# Patient Record
Sex: FEMALE | Race: WHITE | Employment: FULL TIME | URBAN - METROPOLITAN AREA
[De-identification: names, ages, dates, MRNs, and addresses within clinical notes are randomized per-mention and may not be internally consistent; named-entity substitution may affect disease eponyms.]

---

## 2017-11-22 ENCOUNTER — OFFICE VISIT (OUTPATIENT)
Dept: ENDOCRINOLOGY | Age: 22
End: 2017-11-22

## 2017-11-22 VITALS
HEART RATE: 87 BPM | OXYGEN SATURATION: 98 % | WEIGHT: 114 LBS | RESPIRATION RATE: 16 BRPM | HEIGHT: 65 IN | DIASTOLIC BLOOD PRESSURE: 79 MMHG | BODY MASS INDEX: 18.99 KG/M2 | SYSTOLIC BLOOD PRESSURE: 101 MMHG

## 2017-11-22 DIAGNOSIS — E10.65 HYPERGLYCEMIA DUE TO TYPE 1 DIABETES MELLITUS (HCC): Primary | ICD-10-CM

## 2017-11-22 RX ORDER — LORATADINE 10 MG/1
1 TABLET ORAL
COMMUNITY

## 2017-11-22 RX ORDER — INSULIN GLARGINE 100 [IU]/ML
INJECTION, SOLUTION SUBCUTANEOUS
COMMUNITY
Start: 2014-12-09 | End: 2017-11-22 | Stop reason: ALTCHOICE

## 2017-11-22 RX ORDER — PEN NEEDLE, DIABETIC 30 GX3/16"
NEEDLE, DISPOSABLE MISCELLANEOUS
COMMUNITY
Start: 2014-03-24

## 2017-11-22 RX ORDER — LANCETS
EACH MISCELLANEOUS
Qty: 1 EACH | Refills: 11 | Status: SHIPPED | OUTPATIENT
Start: 2017-11-22

## 2017-11-22 RX ORDER — PEN NEEDLE, DIABETIC 30 GX3/16"
NEEDLE, DISPOSABLE MISCELLANEOUS
Qty: 1 PACKAGE | Refills: 5 | Status: SHIPPED | OUTPATIENT
Start: 2017-11-22

## 2017-11-22 RX ORDER — INSULIN ASPART 100 [IU]/ML
25 INJECTION, SUSPENSION SUBCUTANEOUS
Qty: 9 PEN | Refills: 3 | Status: SHIPPED | OUTPATIENT
Start: 2017-11-22 | End: 2018-02-22 | Stop reason: ALTCHOICE

## 2017-11-22 RX ORDER — DICLOFENAC SODIUM 10 MG/G
GEL TOPICAL
COMMUNITY
Start: 2015-05-22

## 2017-11-22 RX ORDER — MEDROXYPROGESTERONE ACETATE 150 MG/ML
INJECTION, SUSPENSION INTRAMUSCULAR
COMMUNITY
Start: 2014-05-30

## 2017-11-22 RX ORDER — METHYLPHENIDATE HYDROCHLORIDE 27 MG/1
36 TABLET ORAL
COMMUNITY

## 2017-11-22 RX ORDER — INSULIN ASPART 100 [IU]/ML
INJECTION, SOLUTION INTRAVENOUS; SUBCUTANEOUS
COMMUNITY
Start: 2015-12-15 | End: 2017-11-22 | Stop reason: ALTCHOICE

## 2017-11-22 NOTE — PROGRESS NOTES
Endocrinology Visit    Chief Complaint: Type 1 diabetes, uncontrolled    History of Present Illness: Milton Dobson is a 23 y.o. female who returns for poorly controlled type 1 diabetes mellitus with last A1c 13.5% in 2016 at the time of her initial visit with me. She has not followed up since that original appointment. Pt was previously followed by Dr Rell Sahu, a pediatric endocrinologist in [place], [place]. Per notes, A1c has been >14% for quite some time and there is mention of persistent poor diabetes control since her diagnosis. She was a sophomore at [employer] when I saw her in 2016 but she has since stopped taking classes because she has two jobs now ([employer] and [employer]). She admits she has a hard time keeping up with her insulin shots due to her work schedule. She is also trying to stretch out her insulin supply. Blood sugars are often high, rarely has lows. Current glycemic medication regimen is Novolog per 1:10 carb ratio TIDcc and she understands how to carb count; taking lantus 30 units daily (takes at bedtime). Last dose was yesterday morning. Uses pens instead of syringes and keeps them in her purse. Rotates injection sites and tries to avoid her stomach, uses her arms and legs mostly. Home blood glucose monitoring frequency: rarely, 3x in the past week  Glucose range at home: 200s, 'HI'. The patient has had hypoglycemia but none recently. She has had a seizure from a low blood sugar in the past (took Novolog instead of Lantus). She now has a tattoo showing that she has diabetes. She does have a glucagon emergency kit but believes it is . She has two roommates, both of whom know that she has diabetes. One of them has a sister with T1DM and knows how to use the glucagon kit. Known complications include retinopathy. Last eye exam was about a year ago, does have retinopathy, and is concerned that her vision is getting worse. Denies sx of neuropathy.  Denies polyuria, polydipsia but has noticed this in the past before her episodes of DKA. Does report she feels much more sluggish when her blood sugars are high. Exercise is described as intermittently. She played lacrosse in high school and enjoys being active, wants to start going to the gym. Diet at home is described as lots of fast food and eating out. Works at [employer]. Loves fruits and vegetables, but also loves pasta and doughnuts. Eats 2 meals/day and snacks as well. Weight is stable and she denies omitting insulin to avoid weight gain. On depo-provera for birth control. Receiving injections every 3 months at her PCP at home. Review of Systems as above, otherwise a 7 pt review is negative    Problem List:  Patient Active Problem List   Diagnosis Code    Hyperglycemia due to type 1 diabetes mellitus ([facility] E10.65       Past Medical History:    Past Medical History:   Diagnosis Date    ADHD (attention deficit hyperactivity disorder)     Type 1 diabetes ([facility]        Past Surgical History:  History reviewed. No pertinent surgical history. Social History:  Social History     Social History    Marital status: SINGLE     Spouse name: N/A    Number of children: N/A    Years of education: N/A     Occupational History    Not on file. Social History Main Topics    Smoking status: Never Smoker    Smokeless tobacco: Never Used    Alcohol use No    Drug use: No    Sexual activity: Not on file     Other Topics Concern    Not on file     Social History Narrative       Family History:  History reviewed. No pertinent family history.     Medications:     Current Outpatient Prescriptions:     glucagon (GLUCAGON EMERGENCY KIT, HUMAN,) 1 mg injection, , Disp: , Rfl:     loratadine (CLARITIN) 10 mg tablet, Take 1 Tab by mouth., Disp: , Rfl:     MULTIVITAMIN WITH MINERALS (MULTIVITAMIN & MINERAL FORMULA PO), Take 2 Tabs by mouth., Disp: , Rfl:     diclofenac (VOLTAREN) 1 % gel, , Disp: , Rfl:     glucose blood VI test strips (ONETOUCH ULTRA TEST) strip, Check blood sugar up to 7x daily. , Disp: , Rfl:     insulin aspart (NOVOLOG) 100 unit/mL inpn, Up to 50 units daily to be used as directed by physician., Disp: , Rfl:     insulin glargine (LANTUS,BASAGLAR) 100 unit/mL (3 mL) inpn, Up to 50 units daily to be used as directed by physician., Disp: , Rfl:     Insulin Needles, Disposable, 31 gauge x 5/16\" ndle, Use up to 7 pen needles daily. 200/1 month supply, Disp: , Rfl:     medroxyPROGESTERone (DEPO-PROVERA) 150 mg/mL injection, , Disp: , Rfl:     methylphenidate ER 36 mg 24 hr tab, Take 36 mg by mouth every morning., Disp: , Rfl:     glucose blood VI test strips (ASCENSIA AUTODISC VI, ONE TOUCH ULTRA TEST VI) strip, 7-8 times/day, Disp: 200 Strip, Rfl: 11    insulin aspart (NOVOLOG FLEXPEN) 100 unit/mL inpn, 6-10 units TIDcc, Disp: 6 Pen, Rfl: 6    Insulin Needles, Disposable, (BD INSULIN PEN NEEDLE UF SHORT) 31 gauge x 5/16\" ndle, For solostar and novolog flex pen, Disp: 1 Package, Rfl: 11    insulin glargine (LANTUS SOLOSTAR) 100 unit/mL (3 mL) pen, 26 units at bedtime, Disp: 6 Each, Rfl: 6    methyphenidate ER 27 mg 24 hr tab, Take 36 mg by mouth., Disp: , Rfl:     multivitamin tablet, Take 1 Tab by mouth daily. , Disp: , Rfl:     multivitamin (ONE A DAY) tablet, Take 1 Tab by mouth daily. , Disp: , Rfl:     Allergies:  No Known Allergies    Physical Examination:  Blood pressure 101/79, pulse 87, resp. rate 16, height 5' 5\" (1.651 m), weight 114 lb (51.7 kg), SpO2 98 %. Gen: no acute distress  HEENT: mucous membranes moist, normocephalic, non traumatic  Thyroid: no enlargement or nodules noted  CAD: normal rate, regular rhythm. No murmur rubs or gallops  PULM: clear to ausculation, no wheezes, rhonchis or rales. GI: soft non tender, non distended.   EXT: mild nonpitting edema BLE, erythematous skin  Neuro: grossly non focal, normal DTRs    Clinical Data Review:     Lab Results   Component Value Date/Time    Hemoglobin A1c 13.6 11/22/2017 01:07 PM    Hemoglobin A1c 13.5 08/08/2016 10:37 AM     Component      Latest Ref Rng & Units 8/8/2016 8/8/2016          10:37 AM 10:37 AM   Glucose      65 - 99 mg/dL  293 (H)   BUN      6 - 20 mg/dL  8   Creatinine      0.57 - 1.00 mg/dL  0.51 (L)   GFR est non-AA      >59 mL/min/1.73  141   GFR est AA      >59 mL/min/1.73  162   BUN/Creatinine ratio      8 - 20  16   Sodium      134 - 144 mmol/L  137   Potassium      3.5 - 5.2 mmol/L  4.6   Chloride      97 - 108 mmol/L  97   CO2      18 - 29 mmol/L  22   Creatinine, urine      Not Estab. mg/dL 39.6    Microalbumin, urine      Not Estab. ug/mL 6.8    Microalbumin/Creat. Ratio      0.0 - 30.0 mg/g creat 17.2      Assessment and Plan:  Patient is a 23y.o. year old female here for poorly controlled type 1 diabetes on basal-bolus insulin regimen. Suboptimal control is the result of inconsistent adherence to insulin regimen and we worked to identify barriers to this today. We discussed at length the importance of glycemic control - not only how this can affect her health but also her career aspirations and ability to perform at work/school. In an effort to simplify her regimen, will trial switching to mixed insulin (BID rather than TID-QID). Eventually I would like to obtain a pump and/or CGM for patient, but I discussed with her that she must first demonstrate the ability to manage a basal-bolus regimen and check BG at least 3-4 times/day. Should a 4 shot regimen continue to fail, we will discuss moving to a 2 shot Novolog 70/30 regimen.     Glycemic Medication Changes:  - switch to Novolog 70/30 25 units BIDAC  - Rx for insulin pens, pen needles, lancets, and test strips sent to pharmacy    DM Health Maintenance: pertinent items updated in HM tab  A1c: update today  Cv/Lipids: not on statin (child bearing age and risks outweigh benefits at this time), consider in the future  BP/Renal: BP at goal, not on ACEi, microalbumin - update today  Vaccines: per PCP  Podiatry: no active issues, encouraged well-fitting footwear and daily inspection  Neruo: no evidence of neuropathy  Ophtho: yearly eye exam recommended  Diet and exercise: discussed healthy eating and exercise recommendations    I spent 25 minutes with the patient today and > 50% of the time was spent counseling the patient about the importance of improved glycemic control, strategies for diabetes management despite a busy schedule, and dietary modification to reduce carbohydrates. Patient verbalized understanding and will return to clinic in 3 months. Thank you for the opportunity to participate in this patient's care.     Theodore Ellis MD  [facility]  [address] [place] [address]

## 2017-11-22 NOTE — PATIENT INSTRUCTIONS
Diabetes Instructions:  - stop Lantus and Novolog  - switch to Novolog 70-30 mixed insulin   - take 25 units twice a day   - take this twice a day before meals (aim for 10-12 hours apart)  - notify me for low blood sugars
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
## 2017-11-22 NOTE — MR AVS SNAPSHOT
Visit Information Date & Time Provider Department Dept. Phone Encounter #  
 11/22/2017 12:10 PM Justin Montgomery, [facility] [phone] [id number] Upcoming Health Maintenance Date Due  
 LIPID PANEL Q1 1997 Hepatitis A Peds Age 1-18 (1 of 2 - Standard Series) 9/9/1998 DTaP/Tdap/Td series (1 - Tdap) 9/9/2004 EYE EXAM RETINAL OR DILATED Q1 9/9/2007 HPV AGE 9Y-26Y (1 of 3 - Female 3 Dose Series) 9/9/2008 Pneumococcal 19-64 Medium Risk (1 of 1 - PPSV23) 9/9/2016 HEMOGLOBIN A1C Q6M 2/8/2017 Influenza Age 5 to Adult 8/1/2017 FOOT EXAM Q1 8/8/2017 MICROALBUMIN Q1 8/8/2017 Allergies as of 11/22/2017  Review Complete On: 11/22/2017 By: Peter Closs, Certified Medical Assistant No Known Allergies Current Immunizations  Never Reviewed No immunizations on file. Not reviewed this visit You Were Diagnosed With   
  
 Codes Comments Hyperglycemia due to type 1 diabetes mellitus ([facility]    -  Primary ICD-10-CM: E10.65 ICD-9-CM: 250.01 Vitals BP Pulse Resp Height(growth percentile) Weight(growth percentile) SpO2  
 101/79 (BP 1 Location: Left arm, BP Patient Position: Sitting) 87 16 5' 5\" (1.651 m) 114 lb (51.7 kg) 98% BMI Smoking Status 18.97 kg/m2 Never Smoker BMI and BSA Data Body Mass Index Body Surface Area  
 18.97 kg/m 2 1.54 m 2 Preferred Pharmacy Pharmacy Name Phone Hanh Soriano [address] [facility] [phone] Your Updated Medication List  
  
   
This list is accurate as of: 11/22/17 12:52 PM.  Always use your most recent med list.  
  
  
  
  
 CLARITIN 10 mg tablet Generic drug:  loratadine Take 1 Tab by mouth. GLUCAGON EMERGENCY KIT (HUMAN) 1 mg injection Generic drug:  glucagon  
  
 insulin aspart protamine/insulin aspart 100 unit/mL (70-30) Inpn Commonly known as:  NovoLOG Mix 70-30 FlexPen 25 Units by SubCUTAneous route Before breakfast and dinner. insulin glargine 100 unit/mL (3 mL) Inpn Commonly known as:  GREER URENA  
26 units at bedtime * Insulin Needles (Disposable) 31 gauge x 5/16\" Ndle Use up to 7 pen needles daily. 200/1 month supply * Insulin Needles (Disposable) 31 gauge x 5/16\" Ndle Commonly known as:  BD INSULIN PEN NEEDLE UF SHORT For solostar and novolog flex pen  
  
 medroxyPROGESTERone 150 mg/mL injection Commonly known as:  DEPO-PROVERA  
  
 * methylphenidate HCl 36 mg CR tablet Commonly known as:  CONCERTA Take 36 mg by mouth every morning. * methylphenidate HCl 27 mg CR tablet Commonly known as:  CONCERTA Take 36 mg by mouth. * multivitamin tablet Take 1 Tab by mouth daily. multivitamin tablet Commonly known as:  ONE A DAY Take 1 Tab by mouth daily. * MULTIVITAMIN & MINERAL FORMULA PO Take 2 Tabs by mouth. * ONETOUCH ULTRA TEST strip Generic drug:  glucose blood VI test strips Check blood sugar up to 7x daily. * glucose blood VI test strips strip Commonly known as:  ASCENSIA AUTODISC VI, ONE TOUCH ULTRA TEST VI  
7-8 times/day VOLTAREN 1 % Gel Generic drug:  diclofenac * Notice: This list has 8 medication(s) that are the same as other medications prescribed for you. Read the directions carefully, and ask your doctor or other care provider to review them with you. Prescriptions Sent to Pharmacy Refills  
 insulin aspart protamine/insulin aspart (NOVOLOG MIX 70-30 FLEXPEN) 100 unit/mL (70-30) inpn 3 Si Units by SubCUTAneous route Before breakfast and dinner. Class: Normal  
 Pharmacy: [facility] [address] [facility] #: [phone]  Route: SubCUTAneous  
 glucose blood VI test strips (ASCENSIA AUTODISC VI, ONE TOUCH ULTRA TEST VI) strip 11  
 Si-8 times/day Class: Normal  
 [facility] [address] [facility] Ph #: [phone] We Performed the Following HEMOGLOBIN A1C WITH EAG [05514 CPT(R)] METABOLIC PANEL, COMPREHENSIVE [12534 CPT(R)] MICROALBUMIN, UR, RAND W/ MICROALBUMIN/CREA RATIO [id number] CPT(R)] TSH AND FREE T4 [96460 CPT(R)] Patient Instructions Diabetes Instructions: 
- stop Lantus and Novolog - switch to Novolog 70-30 mixed insulin 
 - take 25 units twice a day 
 - take this twice a day before meals (aim for 10-12 hours apart) 
- notify me for low blood sugars Introducing \[facility]\"" & HEALTH SERVICES! Andrey Vidal introduces [employer] patient portal. Now you can access parts of your medical record, email your doctor's office, and request medication refills online. 1. In your internet browser, go to [email or web address]/[email or web address] 2. Click on the First Time User? Click Here link in the Sign In box. You will see the New Member Sign Up page. 3. Enter your Control[employer] Access Code exactly as it appears below. You will not need to use this code after youve completed the sign-up process. If you do not sign up before the expiration date, you must request a new code. · Control[employer] Access Code: [email or web address] Expires: 2018 12:42 PM 
 
4. Enter the last four digits of your Social Security Number (xxxx) and Date of Birth (mm/dd/yyyy) as indicated and click Submit. You will be taken to the next sign-up page. 5. Create a Controlus ID. This will be your Controlus login ID and cannot be changed, so think of one that is secure and easy to remember. 6. Create a Control[employer] password. You can change your password at any time. 7. Enter your Password Reset Question and Answer. This can be used at a later time if you forget your password. 8. Enter your e-mail address. You will receive e-mail notification when new information is available in [address] 9. Click Sign Up. You can now view and download portions of your medical record. 10. Click the Download Summary menu link to download a portable copy of your medical information. If you have questions, please visit the Frequently Asked Questions section of the [employer] website. Remember, [employer] is NOT to be used for urgent needs. For medical emergencies, dial 911. Now available from your iPhone and Android! Please provide this summary of care documentation to your next provider. If you have any questions after today's visit, please call [phone].

## 2017-11-23 LAB
ALBUMIN SERPL-MCNC: 4.7 G/DL (ref 3.5–5.5)
ALBUMIN/CREAT UR: <24 MG/G CREAT (ref 0–30)
ALBUMIN/GLOB SERPL: 2.1 {RATIO} (ref 1.2–2.2)
ALP SERPL-CCNC: 102 IU/L (ref 39–117)
ALT SERPL-CCNC: 14 IU/L (ref 0–32)
AST SERPL-CCNC: 13 IU/L (ref 0–40)
BILIRUB SERPL-MCNC: 0.3 MG/DL (ref 0–1.2)
BUN SERPL-MCNC: 11 MG/DL (ref 6–20)
BUN/CREAT SERPL: 17 (ref 9–23)
CALCIUM SERPL-MCNC: 9.7 MG/DL (ref 8.7–10.2)
CHLORIDE SERPL-SCNC: 94 MMOL/L (ref 96–106)
CO2 SERPL-SCNC: 24 MMOL/L (ref 18–29)
CREAT SERPL-MCNC: 0.66 MG/DL (ref 0.57–1)
CREAT UR-MCNC: 12.5 MG/DL
EST. AVERAGE GLUCOSE BLD GHB EST-MCNC: 344 MG/DL
GFR SERPLBLD CREATININE-BSD FMLA CKD-EPI: 128 ML/MIN/1.73
GFR SERPLBLD CREATININE-BSD FMLA CKD-EPI: 147 ML/MIN/1.73
GLOBULIN SER CALC-MCNC: 2.2 G/DL (ref 1.5–4.5)
GLUCOSE SERPL-MCNC: 586 MG/DL (ref 65–99)
HBA1C MFR BLD: 13.6 % (ref 4.8–5.6)
MICROALBUMIN UR-MCNC: <3 UG/ML
POTASSIUM SERPL-SCNC: 5.1 MMOL/L (ref 3.5–5.2)
PROT SERPL-MCNC: 6.9 G/DL (ref 6–8.5)
SODIUM SERPL-SCNC: 135 MMOL/L (ref 134–144)
T4 FREE SERPL-MCNC: 1.32 NG/DL (ref 0.82–1.77)
TSH SERPL DL<=0.005 MIU/L-ACNC: 0.99 UIU/ML (ref 0.45–4.5)

## 2017-11-27 ENCOUNTER — TELEPHONE (OUTPATIENT)
Dept: ENDOCRINOLOGY | Age: 22
End: 2017-11-27

## 2017-11-28 ENCOUNTER — TELEPHONE (OUTPATIENT)
Dept: ENDOCRINOLOGY | Age: 22
End: 2017-11-28

## 2018-02-22 ENCOUNTER — OFFICE VISIT (OUTPATIENT)
Dept: ENDOCRINOLOGY | Age: 23
End: 2018-02-22

## 2018-02-22 VITALS
BODY MASS INDEX: 19.66 KG/M2 | DIASTOLIC BLOOD PRESSURE: 86 MMHG | WEIGHT: 118 LBS | OXYGEN SATURATION: 96 % | HEIGHT: 65 IN | SYSTOLIC BLOOD PRESSURE: 112 MMHG | RESPIRATION RATE: 16 BRPM | HEART RATE: 86 BPM

## 2018-02-22 DIAGNOSIS — E10.65 HYPERGLYCEMIA DUE TO TYPE 1 DIABETES MELLITUS (HCC): Primary | ICD-10-CM

## 2018-02-22 RX ORDER — INSULIN ASPART 100 [IU]/ML
INJECTION, SOLUTION INTRAVENOUS; SUBCUTANEOUS
Qty: 3 PEN | Refills: 5 | Status: SHIPPED | OUTPATIENT
Start: 2018-02-22 | End: 2018-05-30 | Stop reason: ALTCHOICE

## 2018-02-22 RX ORDER — INSULIN DEGLUDEC 100 U/ML
INJECTION, SOLUTION SUBCUTANEOUS
Qty: 5 PEN | Refills: 3 | Status: SHIPPED | OUTPATIENT
Start: 2018-02-22 | End: 2018-05-30 | Stop reason: SDUPTHER

## 2018-02-22 NOTE — PATIENT INSTRUCTIONS
Medication Changes:    Tresiba 30 units daily  Novolog 8 units with each meal; you may use a 1:10 carb ratio if you count carbs  Correction: 1 unit for each 50 over 150

## 2018-02-22 NOTE — MR AVS SNAPSHOT
[address] 
[phone] Patient: Kimberly Linton MRN: [id number] ZIR:2/6/1825 Visit Information Date & Time Provider Department Dept. Phone Encounter #  
 2/22/2018 12:10 PM Jessie Gonzales, [address] [facility] Endocr[facility]ology [phone] Upcoming Health Maintenance Date Due  
 LIPID PANEL Q1 1997 Hepatitis A Peds Age 1-18 (1 of 2 - Standard Series) 9/9/1998 DTaP/Tdap/Td series (1 - Tdap) 9/9/2004 EYE EXAM RETINAL OR DILATED Q1 9/9/2007 HPV AGE 9Y-26Y (1 of 3 - Female 3 Dose Series) 9/9/2008 Pneumococcal 19-64 Medium Risk (1 of 1 - PPSV23) 9/9/2016 Influenza Age 5 to Adult 8/1/2017 FOOT EXAM Q1 8/8/2017 HEMOGLOBIN A1C Q6M 5/22/2018 MICROALBUMIN Q1 11/22/2018 Allergies as of 2/22/2018  Review Complete On: 2/22/2018 By: Ashley Landeros No Known Allergies Current Immunizations  Never Reviewed No immunizations on file. Not reviewed this visit You Were Diagnosed With   
  
 Codes Comments Hyperglycemia due to type 1 diabetes mellitus ([facility]    -  Primary ICD-10-CM: E10.65 ICD-9-CM: 250.01 Vitals BP Pulse Resp Height(growth percentile) Weight(growth percentile) SpO2  
 112/86 86 16 5' 5\" (1.651 m) 118 lb (53.5 kg) 96% BMI Smoking Status 19.64 kg/m2 Never Smoker Vitals History BMI and BSA Data Body Mass Index Body Surface Area 19.64 kg/m 2 1.57 m 2 Preferred Pharmacy Pharmacy Name Phone Kyle Weaver [address] [facility] [phone] Your Updated Medication List  
  
   
This list is accurate as of 2/22/18 12:50 PM.  Always use your most recent med list.  
  
  
  
  
 CLARITIN 10 mg tablet Generic drug:  loratadine Take 1 Tab by mouth. flash glucose sensor Kit Commonly known as:  [address] [place] [address] 1 Kit by Does Not Apply route daily. GLUCAGON EMERGENCY KIT (HUMAN) 1 mg injection Generic drug:  glucagon  
  
 insulin aspart U-100 100 unit/mL Inpn Commonly known as:  NOVOLOG  
5-20 units TIDcc per 1:10 carb ratio  
  
 insulin degludec 100 unit/mL (3 mL) Inpn Commonly known as:  TRESIBA FLEXTOUCH U-100 Inject 30 units daily * Insulin Needles (Disposable) 31 gauge x 5/16\" Ndle Use up to 7 pen needles daily. 200/1 month supply * Insulin Needles (Disposable) 31 gauge x 5/16\" Ndle Commonly known as:  BD INSULIN PEN NEEDLE UF SHORT Use BID for insulin injections Lancets Misc Check BG 7-8 times/day  
  
 medroxyPROGESTERone 150 mg/mL injection Commonly known as:  DEPO-PROVERA  
  
 * methylphenidate HCl 36 mg CR tablet Commonly known as:  CONCERTA Take 36 mg by mouth every morning. * methylphenidate HCl 27 mg CR tablet Commonly known as:  CONCERTA Take 36 mg by mouth. * multivitamin tablet Take 1 Tab by mouth daily. multivitamin tablet Commonly known as:  ONE A DAY Take 1 Tab by mouth daily. * MULTIVITAMIN & MINERAL FORMULA PO Take 2 Tabs by mouth. * ONETOUCH ULTRA TEST strip Generic drug:  glucose blood VI test strips Check blood sugar up to 7x daily. * glucose blood VI test strips strip Commonly known as:  ASCENSIA AUTODISC VI, ONE TOUCH ULTRA TEST VI  
7-8 times/day VOLTAREN 1 % Gel Generic drug:  diclofenac * Notice: This list has 8 medication(s) that are the same as other medications prescribed for you. Read the directions carefully, and ask your doctor or other care provider to review them with you. Prescriptions Sent to Pharmacy Refills  
 insulin degludec (TRESIBA FLEXTOUCH U-100) 100 unit/mL (3 mL) inpn 3 Sig: Inject 30 units daily Class: Normal  
 Pharmacy: [place] [address] [address] [facility] Ph #: [phone] insulin aspart U-100 (NOVOLOG) 100 unit/mL inpn 5 Si-20 units TIDcc per 1:10 carb ratio Class: Normal  
 [facility] Ph #: [phone]  
 flash glucose sensor (FREESTYLE DISHA SENSOR) kit 0 Si Kit by Does Not Apply route daily. Class: Normal  
 [facility] [address] [facility] Ph #: [phone] Route: Does Not Apply Patient Instructions Medication Changes: 
 
Tresiba 30 units daily Novolog 8 units with each meal; you may use a 1:10 carb ratio if you count carbs Correction: 1 unit for each 50 over 150 Introducing [facility]! Dear Travis: Thank you for requesting a [employer] account. Our records indicate that you already have an active [employer] account. You can access your account anytime at [email or web address] Did you know that you can access your hospital and ER discharge instructions at any time in [employer]? You can also review all of your test results from your hospital stay or ER visit. Additional Information If you have questions, please visit the Frequently Asked Questions section of the [employer] website at [email or web address] [email or web address]/. Remember, [employer] is NOT to be used for urgent needs. For medical emergencies, dial 911. Now available from your iPhone and Android! Please provide this summary of care documentation to your next provider. If you have any questions after today's visit, please call [phone].

## 2018-02-22 NOTE — PROGRESS NOTES
Endocrinology Visit    Chief Complaint: Type 1 diabetes, uncontrolled    History of Present Illness: Izzy Martino is a 23 y.o. female who returns for poorly controlled type 1 diabetes mellitus with last A1c 13.5% in August 2016 at the time of her initial visit with me. She has not followed up since that original appointment. Pt was previously followed by Dr Benny Hatch, a pediatric endocrinologist in [place], [place]. Per notes, A1c has been >14% for quite some time and there is mention of persistent poor diabetes control since her diagnosis. At her last visit in November, she was still very poorly compliant with her insulin and had a hard time adhering to 3-4 injections per day due to a busy work schedule. We decided to trial BID 70/30 injections to improve adherence. In the interim, she reports blood sugars have been very high. She is trying to be adherent to her twice a day insulin dosing but has not gotten on a good schedule yet. Admits she needs help. Has not checked her blood sugar since Jan 28th according to her meter. Most values are in the 300-500 range or just \"HI\". Denies hypoglycemia. Current glycemic medication regimen is Novolog 70/30 mix 25 units BID. Rotates injection sites and tries to avoid her stomach, uses her arms and legs mostly. The patient has had hypoglycemia in the past but none recently. She has had a seizure from a low blood sugar in the past (took Novolog instead of Lantus). She now has a tattoo showing that she has diabetes. She does have a glucagon emergency kit. She has two roommates, both of whom know that she has diabetes. One of them has T1DM herself and knows how to use the glucagon kit. Known complications include retinopathy. Last eye exam was over a year ago, does have retinopathy, and is concerned that her vision is getting worse. Denies sx of neuropathy. Denies polyuria, polydipsia but has noticed this in the past before her episodes of DKA.  Does report she feels much more sluggish when her blood sugars are high. Diet at home is described as lots of fast food and eating out. Works at [employer]. Loves fruits and vegetables, but also loves pasta and doughnuts. Eats 2 meals/day and snacks as well. Weight is stable and she denies omitting insulin to avoid weight gain. On depo-provera for birth control. Receiving injections every 3 months per her PCP at home. Review of Systems as above, otherwise a 7 pt review is negative    Problem List:  Patient Active Problem List   Diagnosis Code    Hyperglycemia due to type 1 diabetes mellitus ([facility] E10.65       Past Medical History:    Past Medical History:   Diagnosis Date    ADHD (attention deficit hyperactivity disorder)     Type 1 diabetes ([facility]        Past Surgical History:  History reviewed. No pertinent surgical history. Social History:  Social History     Social History    Marital status: SINGLE     Spouse name: N/A    Number of children: N/A    Years of education: N/A     Occupational History    Not on file. Social History Main Topics    Smoking status: Never Smoker    Smokeless tobacco: Never Used    Alcohol use No    Drug use: No    Sexual activity: Not on file     Other Topics Concern    Not on file     Social History Narrative       Family History:  History reviewed. No pertinent family history. Medications:     Current Outpatient Prescriptions:     glucagon (GLUCAGON EMERGENCY KIT, HUMAN,) 1 mg injection, , Disp: , Rfl:     loratadine (CLARITIN) 10 mg tablet, Take 1 Tab by mouth., Disp: , Rfl:     MULTIVITAMIN WITH MINERALS (MULTIVITAMIN & MINERAL FORMULA PO), Take 2 Tabs by mouth., Disp: , Rfl:     diclofenac (VOLTAREN) 1 % gel, , Disp: , Rfl:     glucose blood VI test strips (ONETOUCH ULTRA TEST) strip, Check blood sugar up to 7x daily. , Disp: , Rfl:     Insulin Needles, Disposable, 31 gauge x 5/16\" ndle, Use up to 7 pen needles daily.   200/1 month supply, Disp: , Rfl:     insulin aspart protamine/insulin aspart (NOVOLOG MIX 70-30 FLEXPEN) 100 unit/mL (70-30) inpn, 25 Units by SubCUTAneous route Before breakfast and dinner., Disp: 9 Pen, Rfl: 3    glucose blood VI test strips (ASCENSIA AUTODISC VI, ONE TOUCH ULTRA TEST VI) strip, 7-8 times/day, Disp: 200 Strip, Rfl: 11    Lancets misc, Check BG 7-8 times/day, Disp: 1 Each, Rfl: 11    methylphenidate ER 36 mg 24 hr tab, Take 36 mg by mouth every morning., Disp: , Rfl:     multivitamin tablet, Take 1 Tab by mouth daily. , Disp: , Rfl:     medroxyPROGESTERone (DEPO-PROVERA) 150 mg/mL injection, , Disp: , Rfl:     methyphenidate ER 27 mg 24 hr tab, Take 36 mg by mouth., Disp: , Rfl:     Insulin Needles, Disposable, (BD INSULIN PEN NEEDLE UF SHORT) 31 gauge x 5/16\" ndle, Use BID for insulin injections, Disp: 1 Package, Rfl: 5    multivitamin (ONE A DAY) tablet, Take 1 Tab by mouth daily. , Disp: , Rfl:     insulin glargine (LANTUS SOLOSTAR) 100 unit/mL (3 mL) pen, 26 units at bedtime, Disp: 6 Each, Rfl: 6    Allergies:  No Known Allergies    Physical Examination:  Visit Vitals    /86    Pulse 86    Resp 16    Ht 5' 5\" (1.651 m)    Wt 118 lb (53.5 kg)    SpO2 96%    BMI 19.64 kg/m2      Gen: no acute distress  HEENT: mucous membranes moist  Thyroid: no enlargement or nodules noted  CAD: normal rate, regular rhythm. No murmur rubs or gallops  PULM: clear to ausculation, no wheezes, rhonchis or rales.   EXT: mild nonpitting edema BLE  Neuro: grossly non focal, normal DTRs    Clinical Data Review:     Lab Results   Component Value Date/Time    Hemoglobin A1c 13.6 (H) 11/22/2017 01:07 PM    Hemoglobin A1c 13.5 (H) 08/08/2016 10:37 AM     Lab Results   Component Value Date/Time    Sodium 135 11/22/2017 01:07 PM    Potassium 5.1 11/22/2017 01:07 PM    Chloride 94 (L) 11/22/2017 01:07 PM    CO2 24 11/22/2017 01:07 PM    Glucose 586 (>) 11/22/2017 01:07 PM    BUN 11 11/22/2017 01:07 PM    Creatinine 0.66 11/22/2017 01:07 PM BUN/Creatinine ratio 17 11/22/2017 01:07 PM    GFR est  11/22/2017 01:07 PM    GFR est non- 11/22/2017 01:07 PM    Calcium 9.7 11/22/2017 01:07 PM      Lab Results   Component Value Date/Time    Microalb/Creat ratio (ug/mg creat.) <24.0 11/22/2017 01:07 PM     No results found for: CHOL, CHOLPOCT, CHOLX, CHLST, CHOLV, HDL, HDLPOC, LDL, LDLCPOC, LDLC, DLDLP, VLDLC, VLDL, TGLX, TRIGL, TRIGP, TGLPOCT, CHHD, CHHDX     Lab Results   Component Value Date/Time    TSH 0.994 11/22/2017 01:07 PM    T4, Free 1.32 11/22/2017 01:07 PM      Assessment and Plan:  Patient is a 23 y.o. female here for poorly controlled type 1 diabetes - we have been unable to achieve control on both a basal-bolus insulin regimen and simplified twice daily mixed insulin. Suboptimal control is likely the result of inconsistent adherence to insulin regimen and we worked to identify barriers to this today. Trial of switching to mixed insulin (BID rather than TID-QID) did not help her numbers improve so will resume basal-bolus with an ultra long-acting insulin. She is interested in wearing a CGM so discussed options for this today - may trial [facility].     Glycemic Medication Changes:  - d/c 70/30 insulin  - start Tresiba 30  Units daily  - Novolog 8 units with each meal; you may use a 1:10 carb ratio if you count carbs  - Correction: 1 unit for each 50 over 150    DM Health Maintenance: pertinent items updated in HM tab  A1c: update at next visit  Cv/Lipids: not on statin (child bearing age and risks outweigh benefits at this time), lipids UTD  BP/Renal: BP at goal, not on ACEi, microalbumin UTD and nonelevated  Vaccines: per PCP  Podiatry: no active issues, encouraged well-fitting footwear and daily inspection  Neruo: no evidence of neuropathy  Ophtho: yearly eye exam recommended  Diet and exercise: discussed healthy eating and exercise recommendations    I spent 25 minutes with the patient today and > 50% of the time was spent counseling the patient about the importance of improved glycemic control, strategies for diabetes management despite a busy schedule, and dietary modification to reduce carbohydrates. Patient verbalized understanding and will return to clinic in 3 months. Thank you for the opportunity to participate in this patient's care.     Arnoldo Willis MD  [facility] Endocr[facility]  [facility]

## 2018-02-22 NOTE — PROGRESS NOTES
Lab Results   Component Value Date/Time    Hemoglobin A1c 13.6 (H) 11/22/2017 01:07 PM    Hemoglobin A1c 13.5 (H) 08/08/2016 10:37 AM

## 2018-02-26 RX ORDER — INSULIN ASPART 100 [IU]/ML
INJECTION, SOLUTION INTRAVENOUS; SUBCUTANEOUS
Qty: 5 PEN | Refills: 5 | Status: SHIPPED | OUTPATIENT
Start: 2018-02-26 | End: 2018-05-30 | Stop reason: SDUPTHER

## 2018-05-30 ENCOUNTER — OFFICE VISIT (OUTPATIENT)
Dept: ENDOCRINOLOGY | Age: 23
End: 2018-05-30

## 2018-05-30 VITALS
HEIGHT: 65 IN | HEART RATE: 107 BPM | SYSTOLIC BLOOD PRESSURE: 103 MMHG | DIASTOLIC BLOOD PRESSURE: 85 MMHG | BODY MASS INDEX: 20.62 KG/M2 | WEIGHT: 123.8 LBS

## 2018-05-30 DIAGNOSIS — E10.65 HYPERGLYCEMIA DUE TO TYPE 1 DIABETES MELLITUS (HCC): Primary | ICD-10-CM

## 2018-05-30 RX ORDER — INSULIN ASPART 100 [IU]/ML
INJECTION, SOLUTION INTRAVENOUS; SUBCUTANEOUS
Qty: 5 PEN | Refills: 5 | Status: SHIPPED | OUTPATIENT
Start: 2018-05-30 | End: 2018-07-23 | Stop reason: ALTCHOICE

## 2018-05-30 RX ORDER — INSULIN DEGLUDEC 100 U/ML
INJECTION, SOLUTION SUBCUTANEOUS
Qty: 5 PEN | Refills: 5 | Status: SHIPPED | OUTPATIENT
Start: 2018-05-30 | End: 2019-03-15 | Stop reason: SDUPTHER

## 2018-05-30 NOTE — MR AVS SNAPSHOT
[address] 
[phone] Patient: Pritesh Ang MRN: [id number] MLZ:[id number] Visit Information Date & Time Provider Department Dept. Phone Encounter #  
 5/30/2018 10:10 AM Al Ferrer, [address] [place] [address] Diabetes and Endocrinology [id number] Upcoming Health Maintenance Date Due  
 LIPID PANEL Q1 1997 Hepatitis A Peds Age 1-18 (1 of 2 - Standard Series) 9/9/1998 DTaP/Tdap/Td series (1 - Tdap) 9/9/2004 EYE EXAM RETINAL OR DILATED Q1 9/9/2007 HPV Age 9Y-34Y (1 of 3 - Female 3 Dose Series) 9/9/2008 Pneumococcal 19-64 Medium Risk (1 of 1 - PPSV23) 9/9/2016 FOOT EXAM Q1 8/8/2017 HEMOGLOBIN A1C Q6M 5/22/2018 Influenza Age 5 to Adult 8/1/2018 MICROALBUMIN Q1 11/22/2018 Allergies as of 5/30/2018  Review Complete On: 5/30/2018 By: Randall Dong LPN No Known Allergies Current Immunizations  Never Reviewed No immunizations on file. Not reviewed this visit You Were Diagnosed With   
  
 Codes Comments Hyperglycemia due to type 1 diabetes mellitus ([facility]    -  Primary ICD-10-CM: E10.65 ICD-9-CM: 250.01 Vitals BP Pulse Height(growth percentile) Weight(growth percentile) BMI OB Status 103/85 (BP 1 Location: Left arm, BP Patient Position: Sitting) (!) 107 5' 5\" (1.651 m) 123 lb 12.8 oz (56.2 kg) 20.6 kg/m2 Injection Smoking Status Never Smoker Vitals History BMI and BSA Data Body Mass Index Body Surface Area  
 20.6 kg/m 2 1.61 m 2 Preferred Pharmacy Pharmacy Name Phone Argenis Horowitz [phone] [facility] [phone] Your Updated Medication List  
  
   
This list is accurate as of 5/30/18 10:43 AM.  Always use your most recent med list.  
  
  
  
  
 Blood-Glucose Sensor Cloteal Hylan Commonly known as:  FREESTYLE NAVIGATOR GLUC SENS Use as directed CLARITIN 10 mg tablet Generic drug:  loratadine Take 1 Tab by mouth. flash glucose sensor Kit Commonly known as:  [address] Change sensor every 10 days GLUCAGON EMERGENCY KIT (HUMAN) 1 mg injection Generic drug:  glucagon  
  
 insulin aspart U-100 100 unit/mL Inpn Commonly known as:  NOVOLOG  
5-20 units TIDcc per 1:10 carb ratio  
  
 insulin degludec 100 unit/mL (3 mL) Inpn Commonly known as:  TRESIBA FLEXTOUCH U-100 Inject 30 units daily * Insulin Needles (Disposable) 31 gauge x 5/16\" Ndle Use up to 7 pen needles daily. 200/1 month supply * Insulin Needles (Disposable) 31 gauge x 5/16\" Ndle Commonly known as:  BD ULTRA-FINE SHORT PEN NEEDLE Use BID for insulin injections Lancets Misc Check BG 7-8 times/day  
  
 medroxyPROGESTERone 150 mg/mL injection Commonly known as:  DEPO-PROVERA  
  
 * methylphenidate HCl 36 mg CR tablet Commonly known as:  CONCERTA Take 36 mg by mouth every morning. * methylphenidate HCl 27 mg CR tablet Commonly known as:  CONCERTA Take 36 mg by mouth. * multivitamin tablet Take 1 Tab by mouth daily. multivitamin tablet Commonly known as:  ONE A DAY Take 1 Tab by mouth daily. * MULTIVITAMIN & MINERAL FORMULA PO Take 2 Tabs by mouth. * ONETOUCH ULTRA TEST strip Generic drug:  glucose blood VI test strips Check blood sugar up to 7x daily. * glucose blood VI test strips strip Commonly known as:  ASCENSIA AUTODISC VI, ONE TOUCH ULTRA TEST VI  
7-8 times/day VOLTAREN 1 % Gel Generic drug:  diclofenac * Notice: This list has 8 medication(s) that are the same as other medications prescribed for you. Read the directions carefully, and ask your doctor or other care provider to review them with you. Prescriptions Sent to Pharmacy Refills insulin degludec (TRESIBA FLEXTOUCH U-100) 100 unit/mL (3 mL) inpn 5 Sig: Inject 30 units daily Class: Normal  
 Pharmacy: [address] [place] [address] Ph #: [phone]  
 insulin aspart U-100 (NOVOLOG) 100 unit/mL inpn 5 Si-20 units TIDcc per 1:10 carb ratio Class: Normal  
 Pharmacy: [address] [place] [address] Ph #: [phone]  
 flash glucose sensor (FREESTYLE DISHA SENSOR) kit 5 Sig: Change sensor every 10 days Class: Normal  
 Pharmacy: [facility]ar Soto [address] [facility] Ph #: [phone] We Performed the Following HEMOGLOBIN A1C WITH EAG [05558 CPT(R)] REFERRAL TO [facility] [id number] Custom] Comments:  
 T1DM, needs pump intro and training - interested in [facility] Referral Information Referral ID Referred By Referred To  
  
 [id number] Speedy Norris, E   
   [address] [facility] Jayyelvin Mazariegos 33 Visits Status Start Date End Date 1 New Request 18 If your referral has a status of pending review or denied, additional information will be sent to support the outcome of this decision. Introducing [facility]! Dear Danyell Jacobsen: Thank you for requesting a Xtone account. Our records indicate that you already have an active Xtone account. You can access your account anytime at [email or web address] Did you know that you can access your hospital and ER discharge instructions at any time in Xtone? You can also review all of your test results from your hospital stay or ER visit. Additional Information If you have questions, please visit the Frequently Asked Questions section of the Xtone website at [email or web address] Remember, Xtone is NOT to be used for urgent needs.  For medical emergencies, dial 911. Now available from your iPhone and Android! Please provide this summary of care documentation to your next provider. If you have any questions after today's visit, please call [phone].

## 2018-05-30 NOTE — PROGRESS NOTES
Dez Corbett is a 23 y.o. female      Chief Complaint   Patient presents with    Diabetes     PCP and pharmacy verified       1. Have you been to the ER, urgent care clinic since your last visit? Hospitalized since your last visit? Yes; May 14th-15th; Due to DKA    2. Have you seen or consulted any other health care providers outside of the [address] [place] [address] since your last visit? Include any pap smears or colon screening.  Yes [facility] for DKA

## 2018-05-30 NOTE — PROGRESS NOTES
Endocrinology Visit    Chief Complaint: Type 1 diabetes, uncontrolled    History of Present Illness: Ashely Nash is a 23 y.o. female who returns for poorly controlled type 1 diabetes mellitus with last A1c 13.6% in November. Pt was previously followed by Dr Celeste Escobar, a pediatric endocrinologist in [place], [place]. Per notes, A1c has been >14% for quite some time and there is mention of persistent poor diabetes control since her diagnosis. At her f/u in November 2017, she was still very poorly compliant with her insulin and had a hard time adhering to 3-4 injections per day due to a busy work schedule. We decided to trial BID 70/30 injections to improve adherence. At her last visit in February, she asked to switch back to basal-bolus, so I resumed [place] and [address]. In the interim, she was admitted to [facility] for DKA May 14-15th. This was due to running out of insulin for a few days - says she was busy with work and didn't go get her refill. She is trying to be adherent to her insulin dosing but has not gotten on a good schedule yet, trying to take her [place] in the morning rather than at night (when she is more like to forget it). Works two jobs - [employer] and [employer]. Admits she needs help and has been looking at the OmniPod pump because she feels this would improve her insulin adherence. She just got the [facility] system and has been using it - likes the fact that she can check her sugar any time without a fingerstick. Avg on reader review: this week is 346, last week was 405. Current glycemic medication regimen is [place] 30 units daily and Novolog 8 units TIDcc. Rotates injection sites and tries to avoid her stomach, uses her arms and legs mostly. The patient has had hypoglycemia in the past but none recently. She has had a seizure from a low blood sugar in the past (took Novolog instead of Lantus). She now has a tattoo showing that she has diabetes. She does have a glucagon emergency kit.  She has two roommates, both of whom know that she has diabetes. One of them has T1DM herself and knows how to use the glucagon kit. Known complications include retinopathy. Last eye exam was over a year ago, does have retinopathy, and is concerned that her vision is getting worse. Denies sx of neuropathy. Denies polyuria, polydipsia but has noticed this in the past before her episodes of DKA. Does report she feels much more sluggish when her blood sugars are high. Diet at home is described as lots of fast food and eating out. Loves fruits and vegetables, but also loves pasta and doughnuts. Eats 2 meals/day and snacks as well. Weight is stable and she denies omitting insulin to avoid weight gain. She was on depo-provera for birth control, but recently decided to stop it. Review of Systems as above, otherwise a 7 pt review is negative    Problem List:  Patient Active Problem List   Diagnosis Code    Hyperglycemia due to type 1 diabetes mellitus ([facility] E10.65       Past Medical History:    Past Medical History:   Diagnosis Date    ADHD (attention deficit hyperactivity disorder)     [facility]        Past Surgical History:  History reviewed. No pertinent surgical history. Social History:  Social History     Social History    Marital status: SINGLE     Spouse name: N/A    Number of children: N/A    Years of education: N/A     Occupational History    Not on file. Social History Main Topics    Smoking status: Never Smoker    Smokeless tobacco: Never Used    Alcohol use No    Drug use: No    Sexual activity: Not on file     Other Topics Concern    Not on file     Social History Narrative       Family History:  History reviewed. No pertinent family history.     Medications:     Current Outpatient Prescriptions:     Blood-Glucose Sensor (FREESTYLE NAVIGATOR GLUC SENS) jose, Use as directed, Disp: 3 Device, Rfl: 11    insulin aspart U-100 (NOVOLOG) 100 unit/mL inpn, 5-20 units TIDcc per 1:10 carb ratio, Disp: 5 Pen, Rfl: 5    insulin degludec (TRESIBA FLEXTOUCH U-100) 100 unit/mL (3 mL) inpn, Inject 30 units daily, Disp: 5 Pen, Rfl: 3    insulin aspart U-100 (NOVOLOG) 100 unit/mL inpn, 5-20 units TIDcc per 1:10 carb ratio, Disp: 3 Pen, Rfl: 5    flash glucose sensor (FREESTYLE DISHA SENSOR) kit, 1 Kit by Does Not Apply route daily. , Disp: 1 Kit, Rfl: 0    glucagon (GLUCAGON EMERGENCY KIT, HUMAN,) 1 mg injection, , Disp: , Rfl:     loratadine (CLARITIN) 10 mg tablet, Take 1 Tab by mouth., Disp: , Rfl:     MULTIVITAMIN WITH MINERALS (MULTIVITAMIN & MINERAL FORMULA PO), Take 2 Tabs by mouth., Disp: , Rfl:     glucose blood VI test strips (ONETOUCH ULTRA TEST) strip, Check blood sugar up to 7x daily. , Disp: , Rfl:     Insulin Needles, Disposable, 31 gauge x 5/16\" ndle, Use up to 7 pen needles daily. 200/1 month supply, Disp: , Rfl:     glucose blood VI test strips (ASCENSIA AUTODISC VI, ONE TOUCH ULTRA TEST VI) strip, 7-8 times/day, Disp: 200 Strip, Rfl: 11    Insulin Needles, Disposable, (BD INSULIN PEN NEEDLE UF SHORT) 31 gauge x 5/16\" ndle, Use BID for insulin injections, Disp: 1 Package, Rfl: 5    Lancets misc, Check BG 7-8 times/day, Disp: 1 Each, Rfl: 11    methylphenidate ER 36 mg 24 hr tab, Take 36 mg by mouth every morning., Disp: , Rfl:     multivitamin tablet, Take 1 Tab by mouth daily. , Disp: , Rfl:     diclofenac (VOLTAREN) 1 % gel, , Disp: , Rfl:     medroxyPROGESTERone (DEPO-PROVERA) 150 mg/mL injection, , Disp: , Rfl:     methyphenidate ER 27 mg 24 hr tab, Take 36 mg by mouth., Disp: , Rfl:     multivitamin (ONE A DAY) tablet, Take 1 Tab by mouth daily. , Disp: , Rfl:     Allergies:  No Known Allergies    Physical Examination:  Visit Vitals    /85 (BP 1 Location: Left arm, BP Patient Position: Sitting)    Pulse (!) 107    Ht 5' 5\" (1.651 m)    Wt 123 lb 12.8 oz (56.2 kg)    BMI 20.6 kg/m2      Gen: no acute distress  HEENT: mucous membranes moist  Thyroid: no enlargement or nodules noted  CAD: tachycardic ~100, regular rhythm. No murmur rubs or gallops  PULM: clear to ausculation, no wheezes, rhonchis or rales. EXT: mild nonpitting edema BLE  Neuro: grossly non focal, normal DTRs    Clinical Data Review:     Lab Results   Component Value Date/Time    Hemoglobin A1c 13.6 (H) 11/22/2017 01:07 PM    Hemoglobin A1c 13.5 (H) 08/08/2016 10:37 AM     Lab Results   Component Value Date/Time    Sodium 135 11/22/2017 01:07 PM    Potassium 5.1 11/22/2017 01:07 PM    Chloride 94 (L) 11/22/2017 01:07 PM    CO2 24 11/22/2017 01:07 PM    Glucose 586 (>) 11/22/2017 01:07 PM    BUN 11 11/22/2017 01:07 PM    Creatinine 0.66 11/22/2017 01:07 PM    BUN/Creatinine ratio 17 11/22/2017 01:07 PM    GFR est  11/22/2017 01:07 PM    GFR est non- 11/22/2017 01:07 PM    Calcium 9.7 11/22/2017 01:07 PM      Lab Results   Component Value Date/Time    Microalb/Creat ratio (ug/mg creat.) <24.0 11/22/2017 01:07 PM     No results found for: CHOL, CHOLPOCT, CHOLX, CHLST, CHOLV, HDL, HDLPOC, LDL, LDLCPOC, LDLC, DLDLP, VLDLC, VLDL, TGLX, TRIGL, TRIGP, TGLPOCT, CHHD, CHHDX     Lab Results   Component Value Date/Time    TSH 0.994 11/22/2017 01:07 PM    T4, Free 1.32 11/22/2017 01:07 PM      Assessment and Plan:  El Banda is a 23 y.o. female here for poorly controlled type 1 diabetes - we have been unable to achieve control on both a basal-bolus insulin regimen and simplified twice daily mixed insulin. Suboptimal control is likely the result of inconsistent adherence to insulin regimen and we worked to identify barriers to this today. Trial of switching to mixed insulin (BID rather than TID-QID) did not help her numbers improve so I resumed basal-bolus with an ultra long-acting insulin.  She is interested in looking at insulin pumps, and although she has not demonstrated the adherence I like to see in a pump candidate, I think the steady insulin infusion would help her avoid recurrent episodes of DKA from prolonged insulin omission. Referred to the [facility] for pump intro and training. Continue basal-bolus for now and will work with [facility] to program prelim pump settings if/when she decides to proceed with pump therapy. Glycemic Medication Changes:  - continue Tresiba 30  Units daily  - Novolog 8 units with each meal; OR 1:10 carb ratio if carb counting  - Correction: 1 unit for each 50 over 150    DM Health Maintenance: pertinent items updated in HM tab  A1c: update today  Cv/Lipids: not on statin (child bearing age and risks outweigh benefits at this time), lipids- update with next labs  BP/Renal: BP at goal, not on ACEi, microalbumin UTD and nonelevated  Vaccines: per PCP  Podiatry: no active issues, encouraged well-fitting footwear and daily inspection  Neruo: no evidence of neuropathy, update foot exam at next visit  Ophtho: yearly eye exam recommended  Diet and exercise: discussed healthy eating and exercise recommendations    I spent 25 minutes with the patient today and > 50% of the time was spent counseling the patient about the importance of improved glycemic control, strategies for diabetes management despite a busy schedule, and dietary modification to reduce carbohydrates. Patient verbalized understanding and will return to clinic in 3 months. Thank you for the opportunity to participate in this patient's care.     Rita Castellanos MD  [place] [facility]  [facility] [place]

## 2018-05-31 ENCOUNTER — TELEPHONE (OUTPATIENT)
Dept: DIABETES SERVICES | Age: 23
End: 2018-05-31

## 2018-05-31 LAB
EST. AVERAGE GLUCOSE BLD GHB EST-MCNC: 381 MG/DL
HBA1C MFR BLD: 14.9 % (ref 4.8–5.6)

## 2018-06-18 ENCOUNTER — HOSPITAL ENCOUNTER (OUTPATIENT)
Dept: DIABETES SERVICES | Age: 23
Discharge: HOME | End: 2018-06-18
Payer: COMMERCIAL

## 2018-06-18 DIAGNOSIS — E10.9 INSULIN DEPENDENT DIABETES MELLITUS TYPE IA (HCC): ICD-10-CM

## 2018-06-18 PROCEDURE — G0108 DIAB MANAGE TRN  PER INDIV: HCPCS | Performed by: DIETITIAN, REGISTERED

## 2018-06-18 NOTE — DIABETES MGMT
DTC Note     Pt seen one on one for insulin pump and CGM exploration. Pt states she currently takes Novolog 1 unit per 10 carbs and Tresiba 30 units once a day usually in the morning. She states she tests blood sugars with a One Touch Ultra 2 meter but also wears a Freestyle Geo CGM. She states she is most interested in learning about the Omnipod system. I brought the pump cart into the office and we looked at each pump using the Pump Comparison sheet as a guide. We discussed basic pump terms like basal, bolus , temp basal, insulin to carb ratio and correction. We discussed the different features of each pump as well as the pros and cons of each pump. Pt was able to see each pump as well as button press on each pump. The pt is still most interested in the [address] system. We discussed the upgrades that are coming with Omnipod soon as well as discussed the Omnipod system in detail. Pt has a history of DKA so we made sure to discuss emergency supplies she would need when wearing an insulin pump as well as DKA prevention when wearing an insulin pump. We also discussed the different CGM options available but the patient would like to continue with the Freestyle Geo at this time and may consider a DexCom G6 later in the future. I provided the patient with an Omnipod patient packet and a demo pod for her to wear for 3 days to see how she likes wearing a pod and to see if she has any issues with adhesion. We discussed that there are several things we can try if she does have any issues with adhesion. Pt will contact [email or web address] to have them verify insurance and see what her next step is to get the Omnipod system. Pt was also provided with my name and phone number for questions.  Shea Randle, RD, CDE

## 2018-07-23 ENCOUNTER — PATIENT MESSAGE (OUTPATIENT)
Dept: ENDOCRINOLOGY | Age: 23
End: 2018-07-23

## 2018-07-23 RX ORDER — INSULIN ASPART 100 [IU]/ML
INJECTION, SOLUTION INTRAVENOUS; SUBCUTANEOUS
Qty: 5 PEN | Refills: 5 | Status: CANCELLED | OUTPATIENT
Start: 2018-07-23

## 2018-07-23 RX ORDER — INSULIN ASPART 100 [IU]/ML
INJECTION, SOLUTION INTRAVENOUS; SUBCUTANEOUS
Qty: 10 ML | Refills: 3 | Status: SHIPPED | OUTPATIENT
Start: 2018-07-23 | End: 2018-08-08 | Stop reason: SDUPTHER

## 2018-07-23 NOTE — TELEPHONE ENCOUNTER
Requested Prescriptions     Pending Prescriptions Disp Refills    insulin aspart U-100 (NOVOLOG) 100 unit/mL inpn 5 Pen 5     Si-20 units TIDcc per 1:10 carb ratio

## 2018-07-23 NOTE — TELEPHONE ENCOUNTER
----- Message from [facility] sent at 7/23/2018  9:14 AM EDT -----  Regarding: Dr. Stevo Samuel refill  The pt called to request a prescription for \"novolog\" be sent to her [address] [place] [address] [place] on file, because she will be receiving a pump tomorrow.        Best contact number is [phone]

## 2018-07-23 NOTE — TELEPHONE ENCOUNTER
I thought she was switching from pens to an insulin pen. Can you check with her to see if she wants pens or vials?

## 2018-07-24 ENCOUNTER — HOSPITAL ENCOUNTER (OUTPATIENT)
Dept: DIABETES SERVICES | Age: 23
Discharge: HOME | End: 2018-07-24

## 2018-07-24 NOTE — DIABETES MGMT
DTC Note      Pt seen one on one for Omnipod start. Her boyfriend attended the appointment with her. Her current insulin regimen has been Blanca Comings 30 units and Novolog using an insulin to carb ratio of 1:10 and a correction of 1:50 over 150. Pt was well prepared for the appointment and had reviewed the training material prior to today's appointment. We reviewed basic pump terms like basal, bolus, temp basal, correction, sensitivity , insulin on board and duration of insulin. We reviewed emergency supplies that she would need with her at all times and reviewed DKA causes, symptoms and prevention. We reviewed Ketone testing and when and why to test for ketones and what to do if they are present. We also reviewed glucagon with her boyfriend as they will be moving in together soon. We set up her PDM and reviewed all of the information/screens in the PDM. We also practiced giving and cancelling a bolus and setting a cancelling a temp basal rate on a demo PDM/active pod. Pt was able to successfully activate a pod with minimal assistance and placed it on the back on her right arm. Pt is also currently using a DiscoveRXay. Advised pt that all blood sugar treatment decisions still need to be based on a fingerstick reading and not on the values provided by the [employer] system. She states she will use her One Touch meter instead of getting Freestyle strips for use with Omnipod. Made sure pt knew how to manually enter blood sugar data into the PDM. Pt's pump setting are (using Novolog)  Insulin to carb ratio 1:10  Correction 1:50  Basal rate  1 unit per hour  Duration of insulin 4 hours  Max Basal 3 units  Max Bolus 20 units  Target Blood sugar 150      Pt states she took her last Tresiba dose yesterday morning. Due to the action of Tresiba we set a temp basal rate of 50% for 12 hours to reduce the risk of hypoglycemia.  Advised pt that if she has any problems with the pump she can call Omnipod toll free 24 hours a day. Also provided my phone number for question. I will follow up with the patient by phone on 7-26-18 and pt also has a follow up appointment with DTC on 8-1-18.  Otis Portillo RD, CDE

## 2018-08-01 ENCOUNTER — HOSPITAL ENCOUNTER (OUTPATIENT)
Dept: DIABETES SERVICES | Age: 23
Discharge: HOME | End: 2018-08-01

## 2018-08-01 NOTE — DIABETES MGMT
DTC Note      Pt seen one on one for follow up with Omnipod. She states she loves the Omnipod so far and finds it much easier then giving multiple daily injections. I looked through her PDM history. Her average blood sugar over the last week was 330 mg/dl and her average carb intake at a meals was around 100 grams. She states she has been moving over the last  5 days so her meals have been at unusual times but that she is a Sisi eater\" and eats high carb meals. She states she is trying to cut back on the carbs she is eating at meals as she has noticed a 12-20 pound weight gain over the last 3 months. She feels her carb counting skills are good she just needs to decrease her portion sizes. She states she feels she does need adjustments to her current pump settings. We made the following changes to her pump settings:  Basal rates was 1.0 and now is 1.2 units per hour  Insulin to carb ratio was 1:10 and now is 1:8  Correction was 1:50 and now is 1:45  All other settings remained the same. She states she is changing her pod eery 3 days but we discussed that with the changes we made to her settings she may need to change sooner (every 2-2.5 days) as she may run low on insulin. She was agreeable to this. We also discussed the extended bolus feature on her pump and discussed when a how to use it. I went into the PDM and cut this feature on so she can start using it now. She stated this feature would be beneficial when she eats larger meals and high fat meals. Pt will follow up with Dr. Pat Cornell on 8-31-18 and with DTC on 9-5-18. She has my name and phone number for questions.  Stacy Miles, ALEXA, CDE

## 2018-08-08 ENCOUNTER — PATIENT MESSAGE (OUTPATIENT)
Dept: ENDOCRINOLOGY | Age: 23
End: 2018-08-08

## 2018-08-08 RX ORDER — INSULIN ASPART 100 [IU]/ML
INJECTION, SOLUTION INTRAVENOUS; SUBCUTANEOUS
Qty: 20 ML | Refills: 3 | Status: SHIPPED | OUTPATIENT
Start: 2018-08-08 | End: 2018-12-11 | Stop reason: SDUPTHER

## 2018-08-08 NOTE — TELEPHONE ENCOUNTER
From: Sadia Citizen  To: Keara Adorno MD  Sent: 8/8/2018 2:12 PM EDT  Subject: Prescription Question    Hi Dr. Reed Brower,    Since I'm still new to wearing a pump, I put in the max 200 units of insulin every time. With that being said, I have already used up my 1000 unit vial. I was hoping that you could write me another prescription, since it's too early for me to refill the current one I have, and possibly change it so that I don't end up running out again. I know in the future I will probably not put the max amount, but until I become more comfortable with everything, I'm going to.     Thank you so much,  Raquel Rosado  [phone]

## 2018-08-31 ENCOUNTER — OFFICE VISIT (OUTPATIENT)
Dept: ENDOCRINOLOGY | Age: 23
End: 2018-08-31

## 2018-08-31 VITALS
BODY MASS INDEX: 22.33 KG/M2 | RESPIRATION RATE: 16 BRPM | WEIGHT: 134 LBS | DIASTOLIC BLOOD PRESSURE: 73 MMHG | HEART RATE: 103 BPM | HEIGHT: 65 IN | SYSTOLIC BLOOD PRESSURE: 131 MMHG | OXYGEN SATURATION: 99 %

## 2018-08-31 DIAGNOSIS — Z46.81 INSULIN PUMP TITRATION: ICD-10-CM

## 2018-08-31 DIAGNOSIS — E10.65 HYPERGLYCEMIA DUE TO TYPE 1 DIABETES MELLITUS (HCC): Primary | ICD-10-CM

## 2018-08-31 NOTE — PROGRESS NOTES
Endocrinology Visit Chief Complaint: Type 1 diabetes, uncontrolled History of Present Illness: Wesly Maurice is a 23 y.o. female who returns for poorly controlled type 1 diabetes mellitus with last A1c 14.9% in May. Pt was previously followed by Dr Jeremiah Gregory, a pediatric endocrinologist in [place], [place]. Per notes, A1c has been >14% for quite some time and there is mention of persistent poor diabetes control since her diagnosis. At her f/u in November 2017, she was still very poorly compliant with her insulin and had a hard time adhering to 3-4 injections per day due to a busy work schedule. We decided to trial BID 70/30 injections to improve adherence. In February, she asked to switch back to basal-bolus, so I resumed Mir and Rj 26. Then at her last visit in May we talked about trying pump therapy to improve her adherence. In the interim, she started insulin pump therapy with the OmniPod on July 24th. She has been to two training visits so far. Settings were adjusted recently due to hyperglycemia (basal was increased and ISF was decreased). She changes her site every 3 days and denies insertion site irritation. She likes the pump so far and feels her sugars are improving. Weight is up 11 lbs but BMI remains normal.  
 
Basal: 1.2 units/hr I/C: 8 Insulin On Board: 4 ISF: 45 
TDD: avg appears to be around 50 units Target Glucose:  Unfortunately we cannot download her pump today so stats above come from manual review. 30 avg on her glucometer is 381. There are a couple days when she did not bolus at all - says she was busy at work and didn't have time to check her blood sugar. The patient has had hypoglycemia in the past but none recently, lowest was in the 70s after correcting for a high of 411 - did not eat, just corrected with her pump. She has had a seizure from a low blood sugar in the past (took Novolog instead of Lantus).  She now has a tattoo showing that she has diabetes. She does have a glucagon emergency kit. Her roommates and boyfriend know she has type 1 diabetes. Known complications include retinopathy. Last eye exam was over a year ago, does have retinopathy, and is concerned that her vision is getting worse. Denies sx of neuropathy. Denies polyuria, polydipsia but has noticed this in the past before her episodes of DKA. Does report she feels much more sluggish when her blood sugars are high. Diet at home is described as lots of fast food and eating out. Loves fruits and vegetables, but also loves pasta and doughnuts. Eats 2 meals/day and snacks as well. Weight is stable and she denies omitting insulin to avoid weight gain. She was on depo-provera for birth control, but recently decided to stop it. Review of Systems as above, otherwise a 7 pt review is negative Problem List: 
Patient Active Problem List  
Diagnosis Code  Hyperglycemia due to type 1 diabetes mellitus ([facility] E10.65 Past Medical History: 
 
Past Medical History:  
Diagnosis Date  ADHD (attention deficit hyperactivity disorder)  Type 1 diabetes ([facility] Past Surgical History: 
History reviewed. No pertinent surgical history. Social History: 
Social History Social History  Marital status: SINGLE Spouse name: N/A  
 Number of children: N/A  
 Years of education: N/A Occupational History  Not on file. Social History Main Topics  Smoking status: Never Smoker  Smokeless tobacco: Never Used  Alcohol use No  
 Drug use: No  
 Sexual activity: Not on file Other Topics Concern  Not on file Social History Narrative Family History: 
History reviewed. No pertinent family history. Medications:  
 
Current Outpatient Prescriptions:  
  insulin aspart U-100 (NOVOLOG U-100 INSULIN ASPART) 100 unit/mL injection, For insulin pump; TDD apprx 80 units/day, Disp: 20 mL, Rfl: 3   flash glucose sensor (FREESTYLE DISHA SENSOR) kit, Change sensor every 10 days, Disp: 3 Each, Rfl: 5   Blood-Glucose Sensor (FREESTYLE NAVIGATOR GLUC SENS) jose, Use as directed, Disp: 3 Device, Rfl: 11 
  loratadine (CLARITIN) 10 mg tablet, Take 1 Tab by mouth., Disp: , Rfl:  
  diclofenac (VOLTAREN) 1 % gel, , Disp: , Rfl:  
  glucose blood VI test strips (ONETOUCH ULTRA TEST) strip, Check blood sugar up to 7x daily. , Disp: , Rfl:  
  Insulin Needles, Disposable, 31 gauge x 5/16\" ndle, Use up to 7 pen needles daily. 200/1 month supply, Disp: , Rfl:  
  Lancets misc, Check BG 7-8 times/day, Disp: 1 Each, Rfl: 11 
  methylphenidate ER 36 mg 24 hr tab, Take 36 mg by mouth every morning., Disp: , Rfl:  
  multivitamin (ONE A DAY) tablet, Take 1 Tab by mouth daily. , Disp: , Rfl:  
  insulin degludec (TRESIBA FLEXTOUCH U-100) 100 unit/mL (3 mL) inpn, Inject 30 units daily, Disp: 5 Pen, Rfl: 5 
  glucagon (GLUCAGON EMERGENCY KIT, HUMAN,) 1 mg injection, , Disp: , Rfl:  
  MULTIVITAMIN WITH MINERALS (MULTIVITAMIN & MINERAL FORMULA PO), Take 2 Tabs by mouth., Disp: , Rfl:  
  medroxyPROGESTERone (DEPO-PROVERA) 150 mg/mL injection, , Disp: , Rfl:  
  methyphenidate ER 27 mg 24 hr tab, Take 36 mg by mouth., Disp: , Rfl:  
  glucose blood VI test strips (ASCENSIA AUTODISC VI, ONE TOUCH ULTRA TEST VI) strip, 7-8 times/day, Disp: 200 Strip, Rfl: 11 
  Insulin Needles, Disposable, (BD INSULIN PEN NEEDLE UF SHORT) 31 gauge x 5/16\" ndle, Use BID for insulin injections, Disp: 1 Package, Rfl: 5 
  multivitamin tablet, Take 1 Tab by mouth daily. , Disp: , Rfl:  
 
Allergies: 
No Known Allergies Physical Examination: 
Visit Vitals  /73  Pulse (!) 103  Resp 16  
 Ht 5' 5\" (1.651 m)  Wt 134 lb (60.8 kg)  SpO2 99%  BMI 22.3 kg/m2 Gen: no acute distress HEENT: mucous membranes moist 
Thyroid: no enlargement or nodules noted CAD: tachycardic ~100, regular rhythm. No murmur rubs or gallops PULM: clear to ausculation, no wheezes, rhonchis or rales. EXT: mild nonpitting edema BLE Neuro: grossly non focal, normal DTRs Diabetic foot exam:  
 
Left Foot: 
 Visual Exam: normal  
 Pulse DP: 2+ (normal) Filament test: normal sensation Right Foot: 
 Visual Exam: normal  
 Pulse DP: 2+ (normal) Filament test: normal sensation Clinical Data Review:  
 
Lab Results Component Value Date/Time Hemoglobin A1c 14.9 (H) 05/30/2018 11:08 AM  
 Hemoglobin A1c 13.6 (H) 11/22/2017 01:07 PM  
 Hemoglobin A1c 13.5 (H) 08/08/2016 10:37 AM  
 
Lab Results Component Value Date/Time Sodium 135 11/22/2017 01:07 PM  
 Potassium 5.1 11/22/2017 01:07 PM  
 Chloride 94 (L) 11/22/2017 01:07 PM  
 CO2 24 11/22/2017 01:07 PM  
 Glucose 586 (>) 11/22/2017 01:07 PM  
 BUN 11 11/22/2017 01:07 PM  
 Creatinine 0.66 11/22/2017 01:07 PM  
 BUN/Creatinine ratio 17 11/22/2017 01:07 PM  
 GFR est  11/22/2017 01:07 PM  
 GFR est non- 11/22/2017 01:07 PM  
 Calcium 9.7 11/22/2017 01:07 PM  
  
Lab Results Component Value Date/Time Microalb/Creat ratio (ug/mg creat.) <24.0 11/22/2017 01:07 PM  
 
No results found for: CHOL, CHOLPOCT, CHOLX, CHLST, CHOLV, HDL, HDLPOC, LDL, LDLCPOC, LDLC, DLDLP, VLDLC, VLDL, TGLX, TRIGL, TRIGP, TGLPOCT, CHHD, CHHDX Lab Results Component Value Date/Time TSH 0.994 11/22/2017 01:07 PM  
 T4, Free 1.32 11/22/2017 01:07 PM  
  
Assessment and Plan: 
Ladonna Walker is a 23 y.o. female here for poorly controlled type 1 diabetes - we have been unable to achieve control on both a basal-bolus insulin regimen and simplified twice daily mixed insulin. Suboptimal control is likely the result of inconsistent adherence to insulin regimen, so she recently switched to pump therapy.  She has been using the OmniPod for the past month and sugars are improving, but she continues to have high insulin requirements and hyperglycemia (sometimes due to lack of bolus insulin). Advised her to take a standard bolus (at least 5 units) with a meal if she is unable to program in her pre-meal BG. Adjusted her target glucose today and advised that she f/u with DTC for her last training session. At her next visit we should be able to download her pump and this will help guide additional adjustments. Will submit Dexcom coverage determination application today as well. Pump Setting Changes: 
Basal: 1.2 units/hr I/C: 8 Insulin On Board: 4 ISF: 45 Target Glucose:  --> 130 DM Health Maintenance: pertinent items updated in HM tab A1c: update before next visit Cv/Lipids: not on statin (child bearing age and risks outweigh benefits at this time), lipids- update with next labs BP/Renal: BP at goal, not on ACEi, microalbumin UTD and nonelevated Vaccines: per PCP Podiatry: no active issues, encouraged well-fitting footwear and daily inspection Neruo: no evidence of neuropathy, foot exam updated Ophtho: yearly eye exam recommended Diet and exercise: discussed healthy eating and exercise recommendations I spent 25 minutes with the patient today and > 50% of the time was spent counseling the patient about the importance of improved glycemic control, strategies for diabetes management despite a busy schedule, and dietary modification to reduce carbohydrates. Patient verbalized understanding and will return to clinic in 4 months. Thank you for the opportunity to participate in this patient's care. Perry Ovalles MD 
[facility] [place] [facility] [address] [place] [address]

## 2018-08-31 NOTE — PROGRESS NOTES
Lab Results Component Value Date/Time  Hemoglobin A1c 14.9 (H) 05/30/2018 11:08 AM  
 Hemoglobin A1c 13.6 (H) 11/22/2017 01:07 PM  
 Hemoglobin A1c 13.5 (H) 08/08/2016 10:37 AM

## 2018-08-31 NOTE — MR AVS SNAPSHOT
[address] [facility] 
[phone] Patient: Jessica Devlin MRN: [id number] RRQ:[id number] Visit Information Date & Time Provider Department Dept. Phone Encounter #  
 8/31/2018  2:10 PM Jose Ramon Church, [address] [place] [address] Diabetes and Endocrinology [phone] [id number] Upcoming Health Maintenance Date Due  
 LIPID PANEL Q1 1997 Hepatitis A Peds Age 1-18 (1 of 2 - Standard Series) 9/9/1998 DTaP/Tdap/Td series (1 - Tdap) 9/9/2004 HPV Age 9Y-34Y (1 of 3 - Female 3 Dose Series) 9/9/2008 Pneumococcal 19-64 Medium Risk (1 of 1 - PPSV23) 9/9/2016 FOOT EXAM Q1 8/8/2017 Influenza Age 5 to Adult 8/1/2018 MICROALBUMIN Q1 11/22/2018 HEMOGLOBIN A1C Q6M 11/30/2018 EYE EXAM RETINAL OR DILATED Q1 6/18/2019 Allergies as of 8/31/2018  Review Complete On: 5/30/2018 By: Jose Ramon Church MD  
 No Known Allergies Current Immunizations  Never Reviewed No immunizations on file. Not reviewed this visit You Were Diagnosed With   
  
 Codes Comments Hyperglycemia due to type 1 diabetes mellitus ([facility]    -  Primary ICD-10-CM: E10.65 ICD-9-CM: 250.01 Vitals BP Pulse Resp Height(growth percentile) Weight(growth percentile) SpO2  
 131/73 (!) 103 16 5' 5\" (1.651 m) 134 lb (60.8 kg) 99% BMI OB Status Smoking Status 22.3 kg/m2 Injection Never Smoker Vitals History BMI and BSA Data Body Mass Index Body Surface Area  
 22.3 kg/m 2 1.67 m 2 Preferred Pharmacy Pharmacy Name Phone [facility] [phone] Your Updated Medication List  
  
   
This list is accurate as of 8/31/18  2:44 PM.  Always use your most recent med list.  
  
  
  
  
 Blood-Glucose Sensor Brittany Jackman Commonly known as:  FREESTYLE NAVIGATOR GLUC SENS Use as directed CLARITIN 10 mg tablet Generic drug:  loratadine Take 1 Tab by mouth. flash glucose sensor Kit Commonly known as:  [address] Change sensor every 10 days GLUCAGON EMERGENCY KIT (HUMAN) 1 mg injection Generic drug:  glucagon  
  
 insulin aspart U-100 100 unit/mL injection Commonly known as:  NovoLOG U-100 Insulin aspart For insulin pump; TDD apprx 80 units/day  
  
 insulin degludec 100 unit/mL (3 mL) Inpn Commonly known as:  TRESIBA FLEXTOUCH U-100 Inject 30 units daily * Insulin Needles (Disposable) 31 gauge x 5/16\" Ndle Use up to 7 pen needles daily. 200/1 month supply * Insulin Needles (Disposable) 31 gauge x 5/16\" Ndle Commonly known as:  BD ULTRA-FINE SHORT PEN NEEDLE Use BID for insulin injections Lancets Misc Check BG 7-8 times/day  
  
 medroxyPROGESTERone 150 mg/mL injection Commonly known as:  DEPO-PROVERA  
  
 * methylphenidate HCl 36 mg CR tablet Commonly known as:  CONCERTA Take 36 mg by mouth every morning. * methylphenidate HCl 27 mg CR tablet Commonly known as:  CONCERTA Take 36 mg by mouth. * multivitamin tablet Take 1 Tab by mouth daily. multivitamin tablet Commonly known as:  ONE A DAY Take 1 Tab by mouth daily. * MULTIVITAMIN & MINERAL FORMULA PO Take 2 Tabs by mouth. * ONETOUCH ULTRA TEST strip Generic drug:  glucose blood VI test strips Check blood sugar up to 7x daily. * glucose blood VI test strips strip Commonly known as:  ASCENSIA AUTODISC VI, ONE TOUCH ULTRA TEST VI  
7-8 times/day VOLTAREN 1 % Gel Generic drug:  diclofenac * Notice: This list has 8 medication(s) that are the same as other medications prescribed for you. Read the directions carefully, and ask your doctor or other care provider to review them with you. We Performed the Following HEMOGLOBIN A1C WITH EAG [58514 CPT(R)]  DIABETES FOOT EXAM [HM7 Custom] LIPID PANEL [40749 CPT(R)] METABOLIC PANEL, COMPREHENSIVE [80657 CPT(R)] MICROALBUMIN, UR, RAND W/ MICROALB/CREAT RATIO [id number] CPT(R)] TSH AND FREE T4 [82057 CPT(R)] To-Do List   
 09/05/2018 1:00 PM  
  Appointment with XAVIER Mendoza at [address] [place] [address] ([phone]) Introducing [facility] SERVICES! Dear Anitha Downeyr: Thank you for requesting a [employer] account. Our records indicate that you already have an active [employer] account. You can access your account anytime at [email or web address] Did you know that you can access your hospital and ER discharge instructions at any time in [employer]? You can also review all of your test results from your hospital stay or ER visit. Additional Information If you have questions, please visit the Frequently Asked Questions section of the [employer] website at [email or web address] Remember, [employer] is NOT to be used for urgent needs. For medical emergencies, dial 911. Now available from your iPhone and Android! Please provide this summary of care documentation to your next provider. Your primary care clinician is listed as NONE. If you have any questions after today's visit, please call [phone].

## 2018-09-06 ENCOUNTER — TELEPHONE (OUTPATIENT)
Dept: DIABETES SERVICES | Age: 23
End: 2018-09-06

## 2018-12-11 RX ORDER — INSULIN ASPART 100 [IU]/ML
INJECTION, SOLUTION INTRAVENOUS; SUBCUTANEOUS
Qty: 2 VIAL | Refills: 1 | Status: SHIPPED | OUTPATIENT
Start: 2018-12-11 | End: 2019-02-21 | Stop reason: SDUPTHER

## 2019-02-21 RX ORDER — INSULIN ASPART 100 [IU]/ML
INJECTION, SOLUTION INTRAVENOUS; SUBCUTANEOUS
Qty: 2 VIAL | Refills: 1 | OUTPATIENT
Start: 2019-02-21 | End: 2019-02-28 | Stop reason: SDUPTHER

## 2019-02-25 ENCOUNTER — PATIENT MESSAGE (OUTPATIENT)
Dept: ENDOCRINOLOGY | Age: 24
End: 2019-02-25

## 2019-02-28 RX ORDER — INSULIN ASPART 100 [IU]/ML
INJECTION, SOLUTION INTRAVENOUS; SUBCUTANEOUS
Qty: 2 VIAL | Refills: 2 | Status: SHIPPED | OUTPATIENT
Start: 2019-02-28 | End: 2019-03-15 | Stop reason: SDUPTHER

## 2019-02-28 NOTE — TELEPHONE ENCOUNTER
From: Selin Davies  To: Teresa Leslie MD  Sent: 2/25/2019 4:37 PM EST  Subject: Prescription Question    Hello,    I notice that you filled my prescription and that the requested pharmacy did not show on the message. I just want to make sure that my refill will be sent to [address] [place] [address] located in [place], [place]. Their phone number is [phone].      Thank you for your time,  Destinee Matthews

## 2019-03-15 ENCOUNTER — OFFICE VISIT (OUTPATIENT)
Dept: ENDOCRINOLOGY | Age: 24
End: 2019-03-15

## 2019-03-15 VITALS
BODY MASS INDEX: 21.46 KG/M2 | HEIGHT: 65 IN | HEART RATE: 92 BPM | SYSTOLIC BLOOD PRESSURE: 129 MMHG | DIASTOLIC BLOOD PRESSURE: 84 MMHG | RESPIRATION RATE: 16 BRPM | OXYGEN SATURATION: 97 % | WEIGHT: 128.8 LBS

## 2019-03-15 DIAGNOSIS — E10.65 HYPERGLYCEMIA DUE TO TYPE 1 DIABETES MELLITUS (HCC): Primary | ICD-10-CM

## 2019-03-15 DIAGNOSIS — Z46.81 INSULIN PUMP TITRATION: ICD-10-CM

## 2019-03-15 RX ORDER — INSULIN ASPART 100 [IU]/ML
INJECTION, SOLUTION INTRAVENOUS; SUBCUTANEOUS
Qty: 2 VIAL | Refills: 3 | Status: SHIPPED | OUTPATIENT
Start: 2019-03-15 | End: 2019-08-24 | Stop reason: SDUPTHER

## 2019-03-15 RX ORDER — INSULIN DEGLUDEC 100 U/ML
INJECTION, SOLUTION SUBCUTANEOUS
Qty: 5 PEN | Refills: 1 | Status: SHIPPED | OUTPATIENT
Start: 2019-03-15

## 2019-03-15 RX ORDER — INSULIN ASPART 100 [IU]/ML
INJECTION, SOLUTION INTRAVENOUS; SUBCUTANEOUS
Qty: 15 ML | Refills: 1 | Status: SHIPPED | OUTPATIENT
Start: 2019-03-15 | End: 2019-08-24

## 2019-03-15 NOTE — PROGRESS NOTES
Endocrinology Visit    Chief Complaint: Type 1 diabetes, uncontrolled    History of Present Illness: aTli Romo is a 26 y.o. female who returns for poorly controlled type 1 diabetes mellitus with last A1c 14.9% in May. Pt was previously followed by Dr Deepthi Pan, a pediatric endocrinologist in [facility]. Per notes, A1c has been >14% for quite some time and there is mention of persistent poor diabetes control since her diagnosis. At her f/u in November 2017, she was still very poorly compliant with her insulin and had a hard time adhering to 3-4 injections per day due to a busy work schedule. We decided to trial BID 70/30 injections to improve adherence. In February, she asked to switch back to basal-bolus, so I resumed Trip Marinleli and Rj 26. Then in May 2018 we talked about trying pump therapy to improve her adherence. She started insulin pump therapy with the OmniPod in July 2018. She has been on the settings below since her initial training because I have not seen her since August. She changes her site every 3 days and denies insertion site irritation. She likes the pump so far and feels her sugars are improving. POC A1c today is 9.3% - the lowest she's been under my care. Basal: 1.2 units/hr  I/C: 8  Insulin On Board: 4  ISF: 45  TDD: avg appears to be around 50 units  Target Glucose: 150    Unfortunately we cannot download her pump today so stats above come from manual review. Her pump history shows that she is using more bolus insulin now (she was previously omitting multiple mealtime boluses). 14d Dexcom stats are below: (see scanned documents for full details). Avg 291 +/- 87  89% above range, 11% in range, 0% below range  Daily patterns reviewed. She appears to have post prandial hyperglycemia which she attributes to not bolusing early enough before her meals. Also feels she may not be taking enough insulin with her meals.  Appears to trend down overnight but has not had hypoglycemia on this report. She does remember one recent episode of overnight hypoglycemia due to a correction bolus at bedtime. Denies recent severe hypoglycemia. Feels the Dexcom helps her prevent lows. She has had a seizure from a low blood sugar in the past (took Novolog instead of Lantus). She now has a tattoo showing that she has diabetes. She does have a glucagon emergency kit. She is living with her parents again in [place] and feels happy with her current situation. She is eating more at home now and eating less fast food. Known complications include retinopathy, however her last eye exam in June 2018 shows no retinopathy. She denies sx of neuropathy. Denies polyuria, polydipsia but has noticed this in the past before her episodes of DKA. Does report she feels much more sluggish when her blood sugars are high, feels better now that her control has improved. Review of Systems as above, otherwise a 7 pt review is negative    Problem List:  Patient Active Problem List   Diagnosis Code    Hyperglycemia due to type 1 diabetes mellitus ([facility]) E10.65    Insulin pump titration Z46.81       Past Medical History:    Past Medical History:   Diagnosis Date    ADHD (attention deficit hyperactivity disorder)     Type 1 diabetes ([address]        Past Surgical History:  No past surgical history on file.     Social History:  Social History     Socioeconomic History    Marital status: SINGLE     Spouse name: Not on file    Number of children: Not on file    Years of education: Not on file    Highest education level: Not on file   Social Needs    Financial resource strain: Not on file    Food insecurity - worry: Not on file    Food insecurity - inability: Not on file   [employer] needs - medical: Not on file   [employer] needs - non-medical: Not on file   Occupational History    Not on file   Tobacco Use    Smoking status: Never Smoker    Smokeless tobacco: Never Used   Substance and Sexual Activity    Alcohol use: No    Drug use: No    Sexual activity: Not on file   Other Topics Concern    Not on file   Social History Narrative    Not on file       Family History:  History reviewed. No pertinent family history. Medications:     Current Outpatient Medications:     insulin aspart U-100 (NOVOLOG U-100 INSULIN ASPART) 100 unit/mL injection, USE TO INJECT VIA INSULIN PUMP AS DIRECTED, Disp: 2 Vial, Rfl: 2    glucagon (GLUCAGON EMERGENCY KIT, HUMAN,) 1 mg injection, , Disp: , Rfl:     loratadine (CLARITIN) 10 mg tablet, Take 1 Tab by mouth., Disp: , Rfl:     diclofenac (VOLTAREN) 1 % gel, , Disp: , Rfl:     medroxyPROGESTERone (DEPO-PROVERA) 150 mg/mL injection, , Disp: , Rfl:     methylphenidate ER 36 mg 24 hr tab, Take 36 mg by mouth every morning., Disp: , Rfl:     multivitamin (ONE A DAY) tablet, Take 1 Tab by mouth daily. , Disp: , Rfl:     insulin degludec (TRESIBA FLEXTOUCH U-100) 100 unit/mL (3 mL) inpn, Inject 30 units daily, Disp: 5 Pen, Rfl: 5    flash glucose sensor (FREESTYLE DISHA SENSOR) kit, Change sensor every 10 days, Disp: 3 Each, Rfl: 5    Blood-Glucose Sensor (FREESTYLE NAVIGATOR GLUC SENS) jose, Use as directed, Disp: 3 Device, Rfl: 11    MULTIVITAMIN WITH MINERALS (MULTIVITAMIN & MINERAL FORMULA PO), Take 2 Tabs by mouth., Disp: , Rfl:     glucose blood VI test strips (ONETOUCH ULTRA TEST) strip, Check blood sugar up to 7x daily. , Disp: , Rfl:     Insulin Needles, Disposable, 31 gauge x 5/16\" ndle, Use up to 7 pen needles daily.   200/1 month supply, Disp: , Rfl:     methyphenidate ER 27 mg 24 hr tab, Take 36 mg by mouth., Disp: , Rfl:     glucose blood VI test strips (ASCENSIA AUTODISC VI, ONE TOUCH ULTRA TEST VI) strip, 7-8 times/day, Disp: 200 Strip, Rfl: 11    Insulin Needles, Disposable, (BD INSULIN PEN NEEDLE UF SHORT) 31 gauge x 5/16\" ndle, Use BID for insulin injections, Disp: 1 Package, Rfl: 5    Lancets misc, Check BG 7-8 times/day, Disp: 1 Each, Rfl: 11    multivitamin tablet, Take 1 Tab by mouth daily. , Disp: , Rfl:     Allergies:  No Known Allergies    Physical Examination:  Visit Vitals  /84   Pulse 92   Resp 16   Ht 5' 5\" (1.651 m)   Wt 128 lb 12.8 oz (58.4 kg)   SpO2 97%   BMI 21.43 kg/m²      Gen: no acute distress  HEENT: mucous membranes moist  PULM: unlabored respirations  GI: pump and sensor sites c/d/i, scarring noted without lipohypertrophy or signs of infection  EXT: mild nonpitting edema BLE  Psych: pleasant, cooperative      Clinical Data Review:     POC A1c 9.3% today    Lab Results   Component Value Date/Time    Hemoglobin A1c 14.9 (H) 05/30/2018 11:08 AM    Hemoglobin A1c 13.6 (H) 11/22/2017 01:07 PM    Hemoglobin A1c 13.5 (H) 08/08/2016 10:37 AM     Lab Results   Component Value Date/Time    Sodium 135 11/22/2017 01:07 PM    Potassium 5.1 11/22/2017 01:07 PM    Chloride 94 (L) 11/22/2017 01:07 PM    CO2 24 11/22/2017 01:07 PM    Glucose 586 (>) 11/22/2017 01:07 PM    BUN 11 11/22/2017 01:07 PM    Creatinine 0.66 11/22/2017 01:07 PM    BUN/Creatinine ratio 17 11/22/2017 01:07 PM    GFR est  11/22/2017 01:07 PM    GFR est non- 11/22/2017 01:07 PM    Calcium 9.7 11/22/2017 01:07 PM      Lab Results   Component Value Date/Time    Microalb/Creat ratio (ug/mg creat.) <24.0 11/22/2017 01:07 PM     No results found for: CHOL, CHOLPOCT, CHOLX, CHLST, CHOLV, HDL, HDLPOC, LDL, LDLCPOC, LDLC, DLDLP, VLDLC, VLDL, TGLX, TRIGL, TRIGP, TGLPOCT, CHHD, CHHDX     Lab Results   Component Value Date/Time    TSH 0.994 11/22/2017 01:07 PM    T4, Free 1.32 11/22/2017 01:07 PM      Assessment and Plan:  Nimesh Owen is a 26 y.o. female here for type 1 diabetes, previously poorly controlled with A1c 13-14% but now much improved since starting insulin pump therapy (A1c ~9%). Based on her Dexcom trends, she needs more bolus insulin with meals and more basal during the day. I also decreased her ISF based on her TDD and decreased her target.  Programmed in the following setting adjustments:    Pump Setting Changes:  Basal:    MN to 9a: 1.2 units/hr   9a to MN: 1.4 units/hr  I/C: 8 --> 7  Insulin On Board: 4  ISF: 45--> 40  Target Glucose: 150 --> 130    DM Health Maintenance: pertinent items updated in HM tab  A1c: updated today  Cv/Lipids: not on statin (child bearing age and risks outweigh benefits at this time), lipids- update today  BP/Renal: BP at goal, not on ACEi, microalbumin UTD and nonelevated  Vaccines: per PCP  Podiatry: no active issues, encouraged well-fitting footwear and daily inspection  Neruo: no evidence of neuropathy, foot exam UTD  Ophtho: yearly eye exam recommended  Diet and exercise: discussed healthy eating and exercise recommendations  Thyroid: update annual TFT screen today    I spent 40 minutes with the patient today and > 50% of the time was spent counseling the patient about insulin and pump management of type 1 diabetes. Patient verbalized understanding and will return to clinic in 3-4 months. She may transfer her care to [facility] where she is living now but knows she can communicate with me via [employer] until then. Thank you for the opportunity to participate in this patient's care.     Lidia Roberts MD  [facility] Endocrinology  [facility]

## 2019-03-15 NOTE — PATIENT INSTRUCTIONS
Pump Settings  Basal:    MN to 9a: 1.2 units/hr   9a to MN: 1.4 units/hr  I/C: 7  Insulin On Board: 4  ISF: 40  Target Glucose: 110-130    Off Pump Plan:  Tresiba 30 units daily  Humalog 1:7 carb ratio

## 2019-03-16 LAB
ALBUMIN SERPL-MCNC: 4.5 G/DL (ref 3.5–5.5)
ALBUMIN/CREAT UR: 9.1 MG/G CREAT (ref 0–30)
ALBUMIN/GLOB SERPL: 1.7 {RATIO} (ref 1.2–2.2)
ALP SERPL-CCNC: 82 IU/L (ref 39–117)
ALT SERPL-CCNC: 10 IU/L (ref 0–32)
AST SERPL-CCNC: 15 IU/L (ref 0–40)
BILIRUB SERPL-MCNC: 0.4 MG/DL (ref 0–1.2)
BUN SERPL-MCNC: 15 MG/DL (ref 6–20)
BUN/CREAT SERPL: 25 (ref 9–23)
CALCIUM SERPL-MCNC: 9.4 MG/DL (ref 8.7–10.2)
CHLORIDE SERPL-SCNC: 102 MMOL/L (ref 96–106)
CHOLEST SERPL-MCNC: 176 MG/DL (ref 100–199)
CO2 SERPL-SCNC: 19 MMOL/L (ref 20–29)
CREAT SERPL-MCNC: 0.59 MG/DL (ref 0.57–1)
CREAT UR-MCNC: 124.4 MG/DL
GLOBULIN SER CALC-MCNC: 2.6 G/DL (ref 1.5–4.5)
GLUCOSE SERPL-MCNC: 161 MG/DL (ref 65–99)
HDLC SERPL-MCNC: 64 MG/DL
LDLC SERPL CALC-MCNC: 95 MG/DL (ref 0–99)
MICROALBUMIN UR-MCNC: 11.3 UG/ML
POTASSIUM SERPL-SCNC: 3.9 MMOL/L (ref 3.5–5.2)
PROT SERPL-MCNC: 7.1 G/DL (ref 6–8.5)
SODIUM SERPL-SCNC: 139 MMOL/L (ref 134–144)
T4 FREE SERPL-MCNC: 1.26 NG/DL (ref 0.82–1.77)
TRIGL SERPL-MCNC: 86 MG/DL (ref 0–149)
TSH SERPL DL<=0.005 MIU/L-ACNC: 1.3 UIU/ML (ref 0.45–4.5)
VLDLC SERPL CALC-MCNC: 17 MG/DL (ref 5–40)

## 2019-08-24 RX ORDER — INSULIN ASPART 100 [IU]/ML
INJECTION, SOLUTION INTRAVENOUS; SUBCUTANEOUS
Qty: 20 ML | Refills: 3 | Status: SHIPPED | OUTPATIENT
Start: 2019-08-24 | End: 2019-11-21 | Stop reason: SDUPTHER

## 2019-11-15 ENCOUNTER — PATIENT MESSAGE (OUTPATIENT)
Dept: ENDOCRINOLOGY | Age: 24
End: 2019-11-15

## 2019-11-21 RX ORDER — INSULIN ASPART 100 [IU]/ML
INJECTION, SOLUTION INTRAVENOUS; SUBCUTANEOUS
Qty: 60 ML | Refills: 1 | Status: SHIPPED | OUTPATIENT
Start: 2019-11-21 | End: 2019-12-19

## 2019-12-19 RX ORDER — INSULIN ASPART 100 [IU]/ML
INJECTION, SOLUTION INTRAVENOUS; SUBCUTANEOUS
Qty: 60 ML | Refills: 1 | Status: SHIPPED | OUTPATIENT
Start: 2019-12-19 | End: 2019-12-23 | Stop reason: SDUPTHER

## 2019-12-19 NOTE — TELEPHONE ENCOUNTER
I spoke with patient and she is not out insulin.   She will be seeing Dr. Alessandro Aguilar in the am.

## 2019-12-19 NOTE — TELEPHONE ENCOUNTER
Please call her and find out if she plans on coming to see one of our providers or a new endocrinologist as I won't be able to keep refilling her insulin without an appointment on the books.

## 2019-12-20 ENCOUNTER — OFFICE VISIT (OUTPATIENT)
Dept: ENDOCRINOLOGY | Age: 24
End: 2019-12-20

## 2019-12-20 VITALS
WEIGHT: 139 LBS | DIASTOLIC BLOOD PRESSURE: 81 MMHG | SYSTOLIC BLOOD PRESSURE: 102 MMHG | HEART RATE: 92 BPM | BODY MASS INDEX: 23.16 KG/M2 | HEIGHT: 65 IN

## 2019-12-20 DIAGNOSIS — E10.65 HYPERGLYCEMIA DUE TO TYPE 1 DIABETES MELLITUS (HCC): Primary | ICD-10-CM

## 2019-12-20 LAB — HBA1C MFR BLD HPLC: 9.1 %

## 2019-12-20 NOTE — PROGRESS NOTES
This is a 43-year-old white female with a history of type 1 diabetes mellitus x6 years currently on an Omni pod and Dexcom system. She was seen by Dr. Taye Hwang until March 2019. She now presents for follow-up. She uses her Omni pod. Her settings are as follows  Midnight 1.2  9 AM 1.4  Bolus 1-8  Insulin on board 4 hours  Correction 50    It should be pointed out that she is doing none of this except for running the basal insulin. She boluses her self 10 to 20 units when she sees an elevated blood sugar. She is not counting carbs and she knows she should be. Her diet is fairly regimented. She works as of  in [employer] from 11 AM to midnight. She is also going to school in nursing school. Her first meal the day is about 10:00 in the morning and she will have a [employer] chicken biscuit with hashbrowns. 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 At midnight when she gets off work she will go to [facility] infant have 3 soft tacos. We downloaded the pump. She is just estimating the 10 to 20 units with meals. We downloaded her Dexcom G6 system. Almost all of the blood sugars are markedly elevated. Average blood sugar 288 and time in range 10%. There are lots of blood sugars that are truncated at the high level. Examination  Blood pressure 102/81  Pulse 92  Weight 139    Diabetic foot exam:     Left Foot:   Visual Exam: normal    Pulse DP: 2+ (normal)   Filament test: normal sensation    Vibratory sensation: normal      Right Foot:   Visual Exam: normal    Pulse DP: 2+ (normal)   Filament test: normal sensation    Vibratory sensation: normal     Impression type 1 diabetes mellitus x6 years with poor blood sugar control. A1c today 9.1%. Plan: I have strongly encouraged her to put her frequently eaten foods into the PDM of her Omni pod so that she can more accurately give herself the right amount of insulin for her meals.   I have also actually asked her to put her blood sugars from her Dexcom into the pump so she is getting the right amount of insulin in the right amount of correction. She does tell us that she may be looking for a endocrinologist closer to where she lives. She currently lives in [place] with her parents. We will see her back in 3 to 4 months. We spent more than 25 mintutes face to face, more than 50% was in counseling regarding diet, exercise and carbohydrate intake. no

## 2019-12-23 RX ORDER — INSULIN ASPART 100 [IU]/ML
INJECTION, SOLUTION INTRAVENOUS; SUBCUTANEOUS
Qty: 60 ML | Refills: 1 | Status: SHIPPED | OUTPATIENT
Start: 2019-12-23 | End: 2020-06-08

## 2020-01-30 ENCOUNTER — TELEPHONE (OUTPATIENT)
Dept: ENDOCRINOLOGY | Age: 25
End: 2020-01-30

## 2020-01-30 NOTE — TELEPHONE ENCOUNTER
----- Message from Kenneth Mendoza sent at 1/30/2020 11:51 AM EST -----  Regarding: Dr. Nj Kearns  General Message/Vendor Calls    Caller's first and last name:      Reason for call:Pt is going to be receiving a new omni pod tomorrow and she is requesting the nurse to call her back to go over the instructions on how start correctly with the new pod.       Callback required yes/no and why:Y      Best contact number(s):[phone]      Details to clarify the request:      Kenneth Mendoza

## 2020-01-30 NOTE — TELEPHONE ENCOUNTER
Spoke with pt and she stated she spilled water on her pump device which deactivated her machine. She will be receiving a new device tomorrow but will need to know all of her pump settings    Please advise.

## 2020-06-08 RX ORDER — INSULIN ASPART 100 [IU]/ML
INJECTION, SOLUTION INTRAVENOUS; SUBCUTANEOUS
Qty: 60 ML | Refills: 1 | Status: SHIPPED | OUTPATIENT
Start: 2020-06-08 | End: 2020-11-10

## 2021-04-29 ENCOUNTER — TELEPHONE (OUTPATIENT)
Dept: ENDOCRINOLOGY | Age: 26
End: 2021-04-29

## 2021-04-29 NOTE — TELEPHONE ENCOUNTER
----- Message from Mike Cesar sent at 4/29/2021 11:26 AM EDT -----  Regarding: Dr. Natalya Caceres with [employer], is requesting a call to check the status of the order for a certificate of medical necessities that was sent on 04/26/2021. [facility] best contact number [phone] ext [phone].  ([phone]

## 2021-04-29 NOTE — TELEPHONE ENCOUNTER
Spoke with Reba and informed her that the patient's LOV was in 2019 and that she would need to be seen in order for Dr. Anuja Allen to fill out any forms. Brittany Thornton stated that she will contact the patient to see if she is being seen by another physician for her diabetes.

## 2021-10-27 RX ORDER — INSULIN ASPART 100 [IU]/ML
INJECTION, SOLUTION INTRAVENOUS; SUBCUTANEOUS
Qty: 60 ML | Refills: 0 | OUTPATIENT
Start: 2021-10-27

## 2022-03-19 PROBLEM — Z46.81 INSULIN PUMP TITRATION: Status: ACTIVE | Noted: 2018-08-31

## 2022-05-02 ENCOUNTER — TELEPHONE (OUTPATIENT)
Dept: ENDOCRINOLOGY | Age: 27
End: 2022-05-02

## 2023-05-21 RX ORDER — INSULIN ASPART 100 [IU]/ML
INJECTION, SOLUTION INTRAVENOUS; SUBCUTANEOUS
COMMUNITY
Start: 2020-11-10

## 2023-05-21 RX ORDER — METHYLPHENIDATE HYDROCHLORIDE 36 MG/1
36 TABLET, EXTENDED RELEASE ORAL
COMMUNITY

## 2023-05-21 RX ORDER — METHYLPHENIDATE HYDROCHLORIDE 27 MG/1
36 TABLET, EXTENDED RELEASE ORAL
COMMUNITY

## 2023-05-21 RX ORDER — MEDROXYPROGESTERONE ACETATE 150 MG/ML
INJECTION, SUSPENSION INTRAMUSCULAR
COMMUNITY
Start: 2014-05-30

## 2023-05-21 RX ORDER — LORATADINE 10 MG/1
1 TABLET ORAL
COMMUNITY

## 2023-05-21 RX ORDER — LANCETS 30 GAUGE
EACH MISCELLANEOUS
COMMUNITY
Start: 2017-11-22